# Patient Record
Sex: FEMALE
[De-identification: names, ages, dates, MRNs, and addresses within clinical notes are randomized per-mention and may not be internally consistent; named-entity substitution may affect disease eponyms.]

---

## 2024-08-15 ENCOUNTER — HOSPITAL ENCOUNTER (OUTPATIENT)
Facility: HOSPITAL | Age: 49
Setting detail: SPECIMEN
Discharge: HOME | End: 2024-08-18

## 2024-08-15 PROCEDURE — 83036 HEMOGLOBIN GLYCOSYLATED A1C: CPT

## 2024-08-15 PROCEDURE — 80053 COMPREHEN METABOLIC PANEL: CPT

## 2024-08-15 PROCEDURE — 80061 LIPID PANEL: CPT

## 2024-08-16 LAB
ALBUMIN SERPL-MCNC: 3.9 G/DL (ref 3.5–5)
ALBUMIN/GLOB SERPL: 1 (ref 1.1–2.2)
ALP SERPL-CCNC: 84 U/L (ref 45–117)
ALT SERPL-CCNC: 16 U/L (ref 12–78)
ANION GAP SERPL CALC-SCNC: 3 MMOL/L (ref 5–15)
AST SERPL-CCNC: 10 U/L (ref 15–37)
BILIRUB SERPL-MCNC: 0.2 MG/DL (ref 0.2–1)
BUN SERPL-MCNC: 13 MG/DL (ref 6–20)
BUN/CREAT SERPL: 21 (ref 12–20)
CALCIUM SERPL-MCNC: 9.8 MG/DL (ref 8.5–10.1)
CHLORIDE SERPL-SCNC: 110 MMOL/L (ref 97–108)
CHOLEST SERPL-MCNC: 193 MG/DL
CO2 SERPL-SCNC: 27 MMOL/L (ref 21–32)
CREAT SERPL-MCNC: 0.61 MG/DL (ref 0.55–1.02)
EST. AVERAGE GLUCOSE BLD GHB EST-MCNC: 114 MG/DL
GLOBULIN SER CALC-MCNC: 4 G/DL (ref 2–4)
GLUCOSE SERPL-MCNC: 97 MG/DL (ref 65–100)
HBA1C MFR BLD: 5.6 % (ref 4–5.6)
HDLC SERPL-MCNC: 46 MG/DL
HDLC SERPL: 4.2 (ref 0–5)
LDLC SERPL CALC-MCNC: 101.4 MG/DL (ref 0–100)
POTASSIUM SERPL-SCNC: 4.3 MMOL/L (ref 3.5–5.1)
PROT SERPL-MCNC: 7.9 G/DL (ref 6.4–8.2)
SODIUM SERPL-SCNC: 140 MMOL/L (ref 136–145)
TRIGL SERPL-MCNC: 228 MG/DL
VLDLC SERPL CALC-MCNC: 45.6 MG/DL

## 2024-08-20 ENCOUNTER — TELEPHONE (OUTPATIENT)
Age: 49
End: 2024-08-20

## 2024-08-20 NOTE — TELEPHONE ENCOUNTER
Called patient to remind of upcoming appointment with [facility] on 8/28/2024- no answer. Left message with appt. Details and [facility] hotline in case that she wants to cancel/reschedule.    Jose Garcia

## 2024-08-26 ENCOUNTER — TELEPHONE (OUTPATIENT)
Age: 49
End: 2024-08-26

## 2024-08-26 NOTE — TELEPHONE ENCOUNTER
Calling patient in re: location change for appointment on 8/28/2024 with [facility]. Pt did not answer. Left message for a call back. Violeta Meredith RN

## 2024-08-28 ENCOUNTER — HOSPITAL ENCOUNTER (OUTPATIENT)
Facility: HOSPITAL | Age: 49
Setting detail: SPECIMEN
Discharge: HOME | End: 2024-08-31

## 2024-08-28 ENCOUNTER — HOSPITAL ENCOUNTER (OUTPATIENT)
Facility: HOSPITAL | Age: 49
Discharge: HOME | End: 2024-08-31

## 2024-08-28 ENCOUNTER — OFFICE VISIT (OUTPATIENT)
Age: 49
End: 2024-08-28

## 2024-08-28 VITALS — WEIGHT: 153 LBS | HEIGHT: 61 IN | BODY MASS INDEX: 28.89 KG/M2

## 2024-08-28 DIAGNOSIS — Z01.419 ENCOUNTER FOR WELL WOMAN EXAM: Primary | ICD-10-CM

## 2024-08-28 DIAGNOSIS — Z12.31 VISIT FOR SCREENING MAMMOGRAM: ICD-10-CM

## 2024-08-28 PROCEDURE — 82728 ASSAY OF FERRITIN: CPT

## 2024-08-28 PROCEDURE — 85025 COMPLETE CBC W/AUTO DIFF WBC: CPT

## 2024-08-28 PROCEDURE — 77063 BREAST TOMOSYNTHESIS BI: CPT

## 2024-08-28 RX ORDER — IBUPROFEN 200 MG
200 TABLET ORAL EVERY 6 HOURS PRN
COMMUNITY

## 2024-08-28 NOTE — PROGRESS NOTES
Assessment/Plan:    Charito was seen today for well woman visit.    Diagnoses and all orders for this visit:    Encounter for well woman exam        No follow-up provider specified.    Zuleyma Leonardo PA-C  Charito Luciano Singh expressed understanding of this plan. An AVS was printed and given to the patient.      ----------------------------------------------------------------------    Chief Complaint   Patient presents with    Well Woman Visit       History of Present Illness:  [facility] annual well woman visit, this is her first visit with us. [place] g[place] care [place]  She had her last pap 2 years ago and is scheduled with [facility] for a pap appt next month  Last period 9 months ago. Having \"heat\" around my neck, + vaginal dryness  Discussed general recs for menopausal sxs and good health after menopause     No breast concerns or complaints  No risk for DV     No past medical history on file.    Current Outpatient Medications   Medication Sig Dispense Refill    ibuprofen (ADVIL;MOTRIN) 200 MG tablet Take 1 tablet by mouth every 6 hours as needed for Pain       No current facility-administered medications for this visit.       No Known Allergies    Social History     Tobacco Use    Smoking status: Never    Smokeless tobacco: Never       No family history on file.    Physical Exam:     LMP 2024 (Approximate)     A&Ox3  WDWN NAD  Respirations normal and non labored  Breast exam- susan neg for mass, tenderness, skin color changes, dimpling or retractions

## 2024-08-28 NOTE — PROGRESS NOTES
EV[facility]OM[facility] LIFE HISTORY QUESTIONNAIRE       No Yes Comments   Has a doctor ever seen or felt anything wrong with your breast? [x]                                  []                                     Have you ever had a breast biopsy? []                                  [x]                                  Pt marked yes on questionnaire- 2 years ago on right breast. Unknown result        When and where was last mammogram performed?  2 years ago- [place]    Have you ever been told that there was a problem on your mammogram?   No Yes Comments   [x]                                  []                                       Do you have breast implants?   No Yes Comments   [x]                                  []                                       When was your last Pap test performed? 2 years ago in [place] but would like a pap smear today     Have you ever had an abnormal Pap test?   No Yes Comments   [x]                                  []                                       Have you had a hysterectomy?   No Yes Comments (why)   [x]                                  []                                       Have you been through menopause?   No Yes Date of LMP   [x]                                  []                                  9 months ago 1/1/2024     Did your mother take ALEXANDR?  No Yes Unknown   []                                  []                                  x     Do you have a history of HIV exposure?  No Yes    [x]                                  []                                       Have you ever been diagnosed with any type of Cancer   No Yes Comments (type,when,where,type of treatment   [x]                                  []                                          Has a family member been diagnosed with breast or ovarian cancer?   No Yes Comments (which family members, and type   [x]                                  []                                       Are you taking hormone  replacement therapy (HRT)     No Yes Comments   [x]                                  []                                       How many times have you been pregnant?   3        Number of live births ? 3    Are you experiencing any of the following?   No Yes Comments   Nipple Discharge [x]                                  []                                     Breast Lump/Masses [x]                                  []                                     Breast Skin Changes [x]                                  []                                          No Yes Comments   Vaginal Discharge [x]                                  []                                     Abnormal/unusual vaginal bleeding [x]                                  []                                         Are you experiencing any other health problems?  None reported      Age at first period? 15  Age at first birth? 18    Ht-- 5'1    Wt-- 153 lbs

## 2024-08-29 LAB
BASOPHILS # BLD: 0.1 K/UL (ref 0–0.1)
BASOPHILS NFR BLD: 1 % (ref 0–1)
DIFFERENTIAL METHOD BLD: ABNORMAL
EOSINOPHIL # BLD: 0.2 K/UL (ref 0–0.4)
EOSINOPHIL NFR BLD: 2 % (ref 0–7)
ERYTHROCYTE [DISTWIDTH] IN BLOOD BY AUTOMATED COUNT: 20.9 % (ref 11.5–14.5)
FERRITIN SERPL-MCNC: 4 NG/ML (ref 8–252)
HCT VFR BLD AUTO: 30.9 % (ref 35–47)
HGB BLD-MCNC: 8.5 G/DL (ref 11.5–16)
IMM GRANULOCYTES # BLD AUTO: 0 K/UL (ref 0–0.04)
IMM GRANULOCYTES NFR BLD AUTO: 0 % (ref 0–0.5)
LYMPHOCYTES # BLD: 2.5 K/UL (ref 0.8–3.5)
LYMPHOCYTES NFR BLD: 26 % (ref 12–49)
MCH RBC QN AUTO: 18.4 PG (ref 26–34)
MCHC RBC AUTO-ENTMCNC: 27.5 G/DL (ref 30–36.5)
MCV RBC AUTO: 66.7 FL (ref 80–99)
MONOCYTES # BLD: 0.9 K/UL (ref 0–1)
MONOCYTES NFR BLD: 9 % (ref 5–13)
NEUTS SEG # BLD: 6.1 K/UL (ref 1.8–8)
NEUTS SEG NFR BLD: 62 % (ref 32–75)
NRBC # BLD: 0 K/UL (ref 0–0.01)
NRBC BLD-RTO: 0 PER 100 WBC
PLATELET # BLD AUTO: 444 K/UL (ref 150–400)
RBC # BLD AUTO: 4.63 M/UL (ref 3.8–5.2)
RBC MORPH BLD: ABNORMAL
WBC # BLD AUTO: 9.8 K/UL (ref 3.6–11)

## 2024-09-18 ENCOUNTER — HOSPITAL ENCOUNTER (INPATIENT)
Facility: HOSPITAL | Age: 49
LOS: 13 days | Discharge: HOME | DRG: 330 | End: 2024-10-01
Attending: EMERGENCY MEDICINE | Admitting: INTERNAL MEDICINE

## 2024-09-18 ENCOUNTER — APPOINTMENT (OUTPATIENT)
Facility: HOSPITAL | Age: 49
DRG: 330 | End: 2024-09-18

## 2024-09-18 DIAGNOSIS — K62.5 RECTAL BLEEDING: ICD-10-CM

## 2024-09-18 DIAGNOSIS — K63.89 COLONIC MASS: Primary | ICD-10-CM

## 2024-09-18 DIAGNOSIS — C20 RECTAL CANCER (HCC): ICD-10-CM

## 2024-09-18 PROBLEM — D62 ACUTE BLOOD LOSS ANEMIA: Status: ACTIVE | Noted: 2024-09-18

## 2024-09-18 PROBLEM — R42 DIZZINESS: Status: ACTIVE | Noted: 2024-09-18

## 2024-09-18 LAB
ALBUMIN SERPL-MCNC: 3.7 G/DL (ref 3.5–5)
ALBUMIN/GLOB SERPL: 0.9 (ref 1.1–2.2)
ALP SERPL-CCNC: 79 U/L (ref 45–117)
ALT SERPL-CCNC: 18 U/L (ref 12–78)
ANION GAP SERPL CALC-SCNC: 4 MMOL/L (ref 2–12)
AST SERPL-CCNC: 12 U/L (ref 15–37)
BASOPHILS # BLD: 0.1 K/UL (ref 0–0.1)
BASOPHILS NFR BLD: 1 % (ref 0–1)
BILIRUB SERPL-MCNC: 0.3 MG/DL (ref 0.2–1)
BUN SERPL-MCNC: 10 MG/DL (ref 6–20)
BUN/CREAT SERPL: 14 (ref 12–20)
CALCIUM SERPL-MCNC: 9.1 MG/DL (ref 8.5–10.1)
CHLORIDE SERPL-SCNC: 109 MMOL/L (ref 97–108)
CO2 SERPL-SCNC: 26 MMOL/L (ref 21–32)
CREAT SERPL-MCNC: 0.69 MG/DL (ref 0.55–1.02)
DIFFERENTIAL METHOD BLD: ABNORMAL
EOSINOPHIL # BLD: 0.4 K/UL (ref 0–0.4)
EOSINOPHIL NFR BLD: 3 % (ref 0–7)
ERYTHROCYTE [DISTWIDTH] IN BLOOD BY AUTOMATED COUNT: 19.9 % (ref 11.5–14.5)
FERRITIN SERPL-MCNC: 3 NG/ML (ref 26–388)
FOLATE SERPL-MCNC: 16.1 NG/ML (ref 5–21)
GLOBULIN SER CALC-MCNC: 4.1 G/DL (ref 2–4)
GLUCOSE SERPL-MCNC: 99 MG/DL (ref 65–100)
HCT VFR BLD AUTO: 26.6 % (ref 35–47)
HCT VFR BLD AUTO: 29.8 % (ref 35–47)
HCT VFR BLD AUTO: 30.6 % (ref 35–47)
HCT VFR BLD AUTO: 33.8 % (ref 35–47)
HEMOCCULT STL QL: POSITIVE
HGB BLD-MCNC: 10.1 G/DL (ref 11.5–16)
HGB BLD-MCNC: 7.6 G/DL (ref 11.5–16)
HGB BLD-MCNC: 8.9 G/DL (ref 11.5–16)
HGB BLD-MCNC: 9.1 G/DL (ref 11.5–16)
HISTORY CHECK: NORMAL
IMM GRANULOCYTES # BLD AUTO: 0.1 K/UL (ref 0–0.04)
IMM GRANULOCYTES NFR BLD AUTO: 1 % (ref 0–0.5)
IRON SATN MFR SERPL: 3 % (ref 20–50)
IRON SERPL-MCNC: 16 UG/DL (ref 35–150)
LIPASE SERPL-CCNC: 32 U/L (ref 13–75)
LYMPHOCYTES # BLD: 3.8 K/UL (ref 0.8–3.5)
LYMPHOCYTES NFR BLD: 32 % (ref 12–49)
MAGNESIUM SERPL-MCNC: 2.3 MG/DL (ref 1.6–2.4)
MCH RBC QN AUTO: 18.3 PG (ref 26–34)
MCHC RBC AUTO-ENTMCNC: 28.6 G/DL (ref 30–36.5)
MCV RBC AUTO: 63.9 FL (ref 80–99)
MONOCYTES # BLD: 1.2 K/UL (ref 0–1)
MONOCYTES NFR BLD: 10 % (ref 5–13)
NEUTS SEG # BLD: 6.2 K/UL (ref 1.8–8)
NEUTS SEG NFR BLD: 53 % (ref 32–75)
NRBC # BLD: 0 K/UL (ref 0–0.01)
NRBC BLD-RTO: 0 PER 100 WBC
PLATELET # BLD AUTO: 438 K/UL (ref 150–400)
POTASSIUM SERPL-SCNC: 3.7 MMOL/L (ref 3.5–5.1)
PROT SERPL-MCNC: 7.8 G/DL (ref 6.4–8.2)
RBC # BLD AUTO: 4.16 M/UL (ref 3.8–5.2)
RBC MORPH BLD: ABNORMAL
SODIUM SERPL-SCNC: 139 MMOL/L (ref 136–145)
TIBC SERPL-MCNC: 499 UG/DL (ref 250–450)
VIT B12 SERPL-MCNC: 238 PG/ML (ref 193–986)
WBC # BLD AUTO: 11.8 K/UL (ref 3.6–11)

## 2024-09-18 PROCEDURE — 82746 ASSAY OF FOLIC ACID SERUM: CPT

## 2024-09-18 PROCEDURE — 6360000004 HC RX CONTRAST MEDICATION: Performed by: INTERNAL MEDICINE

## 2024-09-18 PROCEDURE — 71260 CT THORAX DX C+: CPT

## 2024-09-18 PROCEDURE — 83550 IRON BINDING TEST: CPT

## 2024-09-18 PROCEDURE — 36430 TRANSFUSION BLD/BLD COMPNT: CPT

## 2024-09-18 PROCEDURE — 85018 HEMOGLOBIN: CPT

## 2024-09-18 PROCEDURE — 80053 COMPREHEN METABOLIC PANEL: CPT

## 2024-09-18 PROCEDURE — 6360000004 HC RX CONTRAST MEDICATION: Performed by: RADIOLOGY

## 2024-09-18 PROCEDURE — 86923 COMPATIBILITY TEST ELECTRIC: CPT

## 2024-09-18 PROCEDURE — 82728 ASSAY OF FERRITIN: CPT

## 2024-09-18 PROCEDURE — 99254 IP/OBS CNSLTJ NEW/EST MOD 60: CPT | Performed by: INTERNAL MEDICINE

## 2024-09-18 PROCEDURE — 99285 EMERGENCY DEPT VISIT HI MDM: CPT

## 2024-09-18 PROCEDURE — 85014 HEMATOCRIT: CPT

## 2024-09-18 PROCEDURE — 86901 BLOOD TYPING SEROLOGIC RH(D): CPT

## 2024-09-18 PROCEDURE — 85025 COMPLETE CBC W/AUTO DIFF WBC: CPT

## 2024-09-18 PROCEDURE — 6370000000 HC RX 637 (ALT 250 FOR IP): Performed by: INTERNAL MEDICINE

## 2024-09-18 PROCEDURE — 6360000002 HC RX W HCPCS: Performed by: INTERNAL MEDICINE

## 2024-09-18 PROCEDURE — 36415 COLL VENOUS BLD VENIPUNCTURE: CPT

## 2024-09-18 PROCEDURE — 94761 N-INVAS EAR/PLS OXIMETRY MLT: CPT

## 2024-09-18 PROCEDURE — 2580000003 HC RX 258: Performed by: INTERNAL MEDICINE

## 2024-09-18 PROCEDURE — 86850 RBC ANTIBODY SCREEN: CPT

## 2024-09-18 PROCEDURE — 83540 ASSAY OF IRON: CPT

## 2024-09-18 PROCEDURE — 83735 ASSAY OF MAGNESIUM: CPT

## 2024-09-18 PROCEDURE — 82272 OCCULT BLD FECES 1-3 TESTS: CPT

## 2024-09-18 PROCEDURE — 83690 ASSAY OF LIPASE: CPT

## 2024-09-18 PROCEDURE — 74178 CT ABD&PLV WO CNTR FLWD CNTR: CPT

## 2024-09-18 PROCEDURE — 86900 BLOOD TYPING SEROLOGIC ABO: CPT

## 2024-09-18 PROCEDURE — 30233N1 TRANSFUSION OF NONAUTOLOGOUS RED BLOOD CELLS INTO PERIPHERAL VEIN, PERCUTANEOUS APPROACH: ICD-10-PCS | Performed by: COLON & RECTAL SURGERY

## 2024-09-18 PROCEDURE — 1100000000 HC RM PRIVATE

## 2024-09-18 PROCEDURE — 82607 VITAMIN B-12: CPT

## 2024-09-18 PROCEDURE — 6360000002 HC RX W HCPCS: Performed by: NURSE PRACTITIONER

## 2024-09-18 PROCEDURE — P9016 RBC LEUKOCYTES REDUCED: HCPCS

## 2024-09-18 RX ORDER — POLYETHYLENE GLYCOL 3350 17 G/17G
17 POWDER, FOR SOLUTION ORAL DAILY PRN
Status: DISCONTINUED | OUTPATIENT
Start: 2024-09-18 | End: 2024-09-25

## 2024-09-18 RX ORDER — ACETAMINOPHEN 650 MG/1
650 SUPPOSITORY RECTAL EVERY 6 HOURS PRN
Status: DISCONTINUED | OUTPATIENT
Start: 2024-09-18 | End: 2024-09-27

## 2024-09-18 RX ORDER — MAGNESIUM SULFATE IN WATER 40 MG/ML
2000 INJECTION, SOLUTION INTRAVENOUS PRN
Status: DISCONTINUED | OUTPATIENT
Start: 2024-09-18 | End: 2024-10-01 | Stop reason: HOSPADM

## 2024-09-18 RX ORDER — IOPAMIDOL 755 MG/ML
100 INJECTION, SOLUTION INTRAVASCULAR
Status: COMPLETED | OUTPATIENT
Start: 2024-09-18 | End: 2024-09-18

## 2024-09-18 RX ORDER — PANTOPRAZOLE SODIUM 40 MG/1
40 TABLET, DELAYED RELEASE ORAL
Status: DISCONTINUED | OUTPATIENT
Start: 2024-09-18 | End: 2024-09-24

## 2024-09-18 RX ORDER — ACETAMINOPHEN 325 MG/1
650 TABLET ORAL EVERY 6 HOURS PRN
Status: DISCONTINUED | OUTPATIENT
Start: 2024-09-18 | End: 2024-09-27

## 2024-09-18 RX ORDER — SODIUM CHLORIDE 9 MG/ML
INJECTION, SOLUTION INTRAVENOUS PRN
Status: DISCONTINUED | OUTPATIENT
Start: 2024-09-18 | End: 2024-10-01 | Stop reason: HOSPADM

## 2024-09-18 RX ORDER — SODIUM CHLORIDE 0.9 % (FLUSH) 0.9 %
5-40 SYRINGE (ML) INJECTION PRN
Status: DISCONTINUED | OUTPATIENT
Start: 2024-09-18 | End: 2024-10-01 | Stop reason: HOSPADM

## 2024-09-18 RX ORDER — ONDANSETRON 4 MG/1
4 TABLET, ORALLY DISINTEGRATING ORAL EVERY 8 HOURS PRN
Status: DISCONTINUED | OUTPATIENT
Start: 2024-09-18 | End: 2024-09-26

## 2024-09-18 RX ORDER — ONDANSETRON 2 MG/ML
4 INJECTION INTRAMUSCULAR; INTRAVENOUS 2 TIMES DAILY
Status: DISPENSED | OUTPATIENT
Start: 2024-09-18 | End: 2024-09-20

## 2024-09-18 RX ORDER — ONDANSETRON 2 MG/ML
4 INJECTION INTRAMUSCULAR; INTRAVENOUS EVERY 6 HOURS PRN
Status: DISCONTINUED | OUTPATIENT
Start: 2024-09-18 | End: 2024-09-26

## 2024-09-18 RX ORDER — SODIUM CHLORIDE 0.9 % (FLUSH) 0.9 %
5-40 SYRINGE (ML) INJECTION EVERY 12 HOURS SCHEDULED
Status: DISCONTINUED | OUTPATIENT
Start: 2024-09-18 | End: 2024-10-01 | Stop reason: HOSPADM

## 2024-09-18 RX ADMIN — IOPAMIDOL 80 ML: 755 INJECTION, SOLUTION INTRAVENOUS at 19:10

## 2024-09-18 RX ADMIN — IRON SUCROSE 200 MG: 20 INJECTION, SOLUTION INTRAVENOUS at 17:25

## 2024-09-18 RX ADMIN — IOPAMIDOL 100 ML: 755 INJECTION, SOLUTION INTRAVENOUS at 01:26

## 2024-09-18 RX ADMIN — PANTOPRAZOLE SODIUM 40 MG: 40 TABLET, DELAYED RELEASE ORAL at 05:33

## 2024-09-18 RX ADMIN — SODIUM CHLORIDE, PRESERVATIVE FREE 10 ML: 5 INJECTION INTRAVENOUS at 20:28

## 2024-09-18 RX ADMIN — PANTOPRAZOLE SODIUM 40 MG: 40 TABLET, DELAYED RELEASE ORAL at 17:25

## 2024-09-18 RX ADMIN — ONDANSETRON 4 MG: 2 INJECTION INTRAMUSCULAR; INTRAVENOUS at 09:53

## 2024-09-18 RX ADMIN — SODIUM CHLORIDE, PRESERVATIVE FREE 10 ML: 5 INJECTION INTRAVENOUS at 09:53

## 2024-09-18 ASSESSMENT — PAIN DESCRIPTION - LOCATION: LOCATION: ABDOMEN

## 2024-09-18 ASSESSMENT — PAIN DESCRIPTION - ORIENTATION: ORIENTATION: LOWER

## 2024-09-18 ASSESSMENT — PAIN - FUNCTIONAL ASSESSMENT: PAIN_FUNCTIONAL_ASSESSMENT: 0-10

## 2024-09-18 ASSESSMENT — LIFESTYLE VARIABLES: HOW OFTEN DO YOU HAVE A DRINK CONTAINING ALCOHOL: NEVER

## 2024-09-18 ASSESSMENT — PAIN SCALES - GENERAL: PAINLEVEL_OUTOF10: 2

## 2024-09-18 NOTE — CONSENT
Informed Consent for Blood Component Transfusion Note    I have discussed with the patient the rationale for blood component transfusion; its benefits in treating or preventing fatigue, organ damage, or death; and its risk which includes mild transfusion reactions, rare risk of blood borne infection, or more serious but rare reactions. I have discussed the alternatives to transfusion, including the risk and consequences of not receiving transfusion. The patient had an opportunity to ask questions and had agreed to proceed with transfusion of blood components as needed.    Electronically signed by Sergey Mayo MD on 9/18/24 at 3:33 AM EDT

## 2024-09-18 NOTE — ED PROVIDER NOTES
EMERGENCY DEPARTMENT PHYSICIAN NOTE     Patient: Charito Singh     Time of Service: 9/18/2024 12:19 AM     Chief complaint:   Chief Complaint   Patient presents with    Rectal Bleeding        HISTORY:  Patient is a 49 y.o. female who presents to the emergency department with complaints of rectal bleeding.       No past medical history on file.     Past Surgical History:   Procedure Laterality Date    BREAST BIOPSY Right     10 years ago        No family history on file.     Social History     Socioeconomic History    Marital status:    Tobacco Use    Smoking status: Never    Smokeless tobacco: Never        Current Medications: Reviewed in chart.    Allergies: No Known Allergies       REVIEW OF SYSTEMS: See HPI for pertinent positives and negatives.      PHYSICAL EXAM:  /66   Pulse 89   Temp 98.4 °F (36.9 °C) (Oral)   Resp 16   Ht 1.626 m (5' 4\")   Wt 69 kg (152 lb 1.9 oz)   LMP 01/01/2024 (Approximate)   SpO2 100%   BMI 26.11 kg/m²    Physical Exam  Vitals and nursing note reviewed. Exam conducted with a chaperone present.   Constitutional:       General: She is not in acute distress.     Appearance: Normal appearance. She is normal weight. She is not toxic-appearing.   HENT:      Head: Normocephalic and atraumatic.      Nose: Nose normal.      Mouth/Throat:      Mouth: Mucous membranes are moist.      Pharynx: Oropharynx is clear.   Eyes:      Extraocular Movements: Extraocular movements intact.      Conjunctiva/sclera: Conjunctivae normal.      Pupils: Pupils are equal, round, and reactive to light.   Cardiovascular:      Rate and Rhythm: Normal rate and regular rhythm.      Pulses: Normal pulses.      Heart sounds: Normal heart sounds.   Pulmonary:      Effort: Pulmonary effort is normal.      Breath sounds: Normal breath sounds. No wheezing.   Abdominal:      General: Abdomen is flat. Bowel sounds are normal.      Palpations: Abdomen is soft.      Tenderness: There is

## 2024-09-18 NOTE — CARE COORDINATION
9/18/2024  4:20 PM     09/18/24 8056   Service Assessment   Patient Orientation Alert and Oriented   Cognition Alert   History Provided By Patient   Primary Caregiver Self   Support Systems Friends/Neighbors   Patient's Healthcare Decision Maker is: Legal Next of Kin   PCP Verified by CM No  (pt has no PCP)   Prior Functional Level Independent in ADLs/IADLs   Current Functional Level Independent in ADLs/IADLs   Can patient return to prior living arrangement Yes   Ability to make needs known: Good   Family able to assist with home care needs: Yes   Financial Resources None   Community Resources None   Social/Functional History   Lives With Friend(s)   Type of Home Trailer   Home Equipment None   ADL Assistance Independent   Ambulation Assistance Independent   Transfer Assistance Independent   Active  No   Patient's  Info Friend or public transportation   Mode of Transportation Car;Bus   Discharge Planning   Type of Residence House   Living Arrangements Friends   Current Services Prior To Admission None   Potential Assistance Needed Other (Comment)  (Medications)   Potential Assistance Purchasing Medications Yes   Type of Home Care Services None   Patient expects to be discharged to: Trailer/mobile home   History of falls? 0   Services At/After Discharge   Mode of Transport at Discharge Other (see comment)  (Friend or Lyft/Uber)   Confirm Follow Up Transport Self     Pt is a 48 yo Ethiopian speaking female, emergently admitted 9/18/24 for colonic mass, rectal bleeding, GI is following, plan for colonoscopy 9/20, Oncology following.  CM met w/ pt for DC planning, using [email or web address]  Harsh # [id number]   CM introduced self, role of CM.  Charted demographics verified, pt lives w/ friend Jojo Sexton in a trailer home  At baseline pt reports to be ambulatory, independent  w/ ADLs/iADLs, pt uses no DME  Pt has seen MD at [facility] 8/2024 for colon   Pt is not employed and has not applied for Medicaid in

## 2024-09-18 NOTE — CONSENT
Informed Consent for Blood Component Transfusion Note    I have discussed with the patient the rationale for blood component transfusion; its benefits in treating or preventing fatigue, organ damage, or death; and its risk which includes mild transfusion reactions, rare risk of blood borne infection, or more serious but rare reactions. I have discussed the alternatives to transfusion, including the risk and consequences of not receiving transfusion. The patient had an opportunity to ask questions and had agreed to proceed with transfusion of blood components.    Electronically signed by Isaiah Dao DO on 9/18/24 at 2:51 AM EDT

## 2024-09-18 NOTE — ED NOTES
TRANSFER - OUT REPORT:    Verbal report given on Charito Singh  being transferred to [facility] for routine progression of patient care       Report consisted of patient's Situation, Background, Assessment and   Recommendations(SBAR).     Information from the following report(s) Nurse Handoff Report, ED SBAR, MAR, Recent Results, Quality Measures, and Neuro Assessment was reviewed with the receiving nurse.    [place] Fall Assessment:    Presents to emergency department  because of falls (Syncope, seizure, or loss of consciousness): No  Age > 70: No  Altered Mental Status, Intoxication with alcohol or substance confusion (Disorientation, impaired judgment, poor safety awaremess, or inability to follow instructions): No  Impaired Mobility: Ambulates or transfers with assistive devices or assistance; Unable to ambulate or transer.: No  Nursing Judgement: No          Lines:   Peripheral IV 09/18/24 Left Antecubital (Active)        Opportunity for questions and clarification was provided.      Patient transported with:  Registered Nurse

## 2024-09-18 NOTE — ED TRIAGE NOTES
Patient arrives via POV with complaints of rectal bleeding.  Has had bloody bowel movements since last Saturday.  Clots appear to be in the stool.  Has abdominal pain, has N/V.  Not on blood thinners.  Denies abdominal surgeries.  Says she has been \"constipated for a year\".    Became dizzy this morning while using bathroom.

## 2024-09-19 LAB
ABO + RH BLD: NORMAL
ALBUMIN SERPL-MCNC: 3.5 G/DL (ref 3.5–5)
ALBUMIN/GLOB SERPL: 0.9 (ref 1.1–2.2)
ALP SERPL-CCNC: 70 U/L (ref 45–117)
ALT SERPL-CCNC: 15 U/L (ref 12–78)
ANION GAP SERPL CALC-SCNC: 7 MMOL/L (ref 2–12)
AST SERPL-CCNC: 12 U/L (ref 15–37)
BASOPHILS # BLD: 0.1 K/UL (ref 0–0.1)
BASOPHILS NFR BLD: 1 % (ref 0–1)
BILIRUB SERPL-MCNC: 0.6 MG/DL (ref 0.2–1)
BLD PROD TYP BPU: NORMAL
BLOOD BANK BLOOD PRODUCT EXPIRATION DATE: NORMAL
BLOOD BANK DISPENSE STATUS: NORMAL
BLOOD BANK ISBT PRODUCT BLOOD TYPE: 5100
BLOOD BANK PRODUCT CODE: NORMAL
BLOOD BANK UNIT TYPE AND RH: NORMAL
BLOOD GROUP ANTIBODIES SERPL: NORMAL
BPU ID: NORMAL
BUN SERPL-MCNC: 7 MG/DL (ref 6–20)
BUN/CREAT SERPL: 12 (ref 12–20)
CALCIUM SERPL-MCNC: 9.2 MG/DL (ref 8.5–10.1)
CEA SERPL-MCNC: 1.2 NG/ML
CHLORIDE SERPL-SCNC: 107 MMOL/L (ref 97–108)
CO2 SERPL-SCNC: 25 MMOL/L (ref 21–32)
CREAT SERPL-MCNC: 0.59 MG/DL (ref 0.55–1.02)
CROSSMATCH RESULT: NORMAL
DIFFERENTIAL METHOD BLD: ABNORMAL
EOSINOPHIL # BLD: 0.3 K/UL (ref 0–0.4)
EOSINOPHIL NFR BLD: 4 % (ref 0–7)
ERYTHROCYTE [DISTWIDTH] IN BLOOD BY AUTOMATED COUNT: 22.9 % (ref 11.5–14.5)
GLOBULIN SER CALC-MCNC: 3.9 G/DL (ref 2–4)
GLUCOSE SERPL-MCNC: 100 MG/DL (ref 65–100)
HCT VFR BLD AUTO: 29.9 % (ref 35–47)
HCT VFR BLD AUTO: 30.7 % (ref 35–47)
HGB BLD-MCNC: 9 G/DL (ref 11.5–16)
HGB BLD-MCNC: 9.1 G/DL (ref 11.5–16)
IMM GRANULOCYTES # BLD AUTO: 0.1 K/UL (ref 0–0.04)
IMM GRANULOCYTES NFR BLD AUTO: 1 % (ref 0–0.5)
LYMPHOCYTES # BLD: 1.1 K/UL (ref 0.8–3.5)
LYMPHOCYTES NFR BLD: 14 % (ref 12–49)
MCH RBC QN AUTO: 20.1 PG (ref 26–34)
MCHC RBC AUTO-ENTMCNC: 30.1 G/DL (ref 30–36.5)
MCV RBC AUTO: 66.9 FL (ref 80–99)
MONOCYTES # BLD: 0.7 K/UL (ref 0–1)
MONOCYTES NFR BLD: 8 % (ref 5–13)
NEUTS SEG # BLD: 5.9 K/UL (ref 1.8–8)
NEUTS SEG NFR BLD: 72 % (ref 32–75)
NRBC # BLD: 0 K/UL (ref 0–0.01)
NRBC BLD-RTO: 0 PER 100 WBC
PLATELET # BLD AUTO: 379 K/UL (ref 150–400)
PMV BLD AUTO: 10.6 FL (ref 8.9–12.9)
POTASSIUM SERPL-SCNC: 3.7 MMOL/L (ref 3.5–5.1)
PROT SERPL-MCNC: 7.4 G/DL (ref 6.4–8.2)
RBC # BLD AUTO: 4.47 M/UL (ref 3.8–5.2)
RBC MORPH BLD: ABNORMAL
SODIUM SERPL-SCNC: 139 MMOL/L (ref 136–145)
SPECIMEN EXP DATE BLD: NORMAL
UNIT DIVISION: 0
UNIT ISSUE DATE/TIME: NORMAL
WBC # BLD AUTO: 8.2 K/UL (ref 3.6–11)

## 2024-09-19 PROCEDURE — 6370000000 HC RX 637 (ALT 250 FOR IP): Performed by: INTERNAL MEDICINE

## 2024-09-19 PROCEDURE — 93005 ELECTROCARDIOGRAM TRACING: CPT | Performed by: INTERNAL MEDICINE

## 2024-09-19 PROCEDURE — 2580000003 HC RX 258

## 2024-09-19 PROCEDURE — 2580000003 HC RX 258: Performed by: INTERNAL MEDICINE

## 2024-09-19 PROCEDURE — 94761 N-INVAS EAR/PLS OXIMETRY MLT: CPT

## 2024-09-19 PROCEDURE — 6370000000 HC RX 637 (ALT 250 FOR IP)

## 2024-09-19 PROCEDURE — 36415 COLL VENOUS BLD VENIPUNCTURE: CPT

## 2024-09-19 PROCEDURE — 1100000000 HC RM PRIVATE

## 2024-09-19 PROCEDURE — 80053 COMPREHEN METABOLIC PANEL: CPT

## 2024-09-19 PROCEDURE — 6360000002 HC RX W HCPCS: Performed by: INTERNAL MEDICINE

## 2024-09-19 PROCEDURE — 82378 CARCINOEMBRYONIC ANTIGEN: CPT

## 2024-09-19 PROCEDURE — 85025 COMPLETE CBC W/AUTO DIFF WBC: CPT

## 2024-09-19 PROCEDURE — 6360000002 HC RX W HCPCS: Performed by: NURSE PRACTITIONER

## 2024-09-19 PROCEDURE — 85018 HEMOGLOBIN: CPT

## 2024-09-19 PROCEDURE — 85014 HEMATOCRIT: CPT

## 2024-09-19 RX ORDER — SODIUM CHLORIDE 9 MG/ML
INJECTION, SOLUTION INTRAVENOUS CONTINUOUS
Status: DISCONTINUED | OUTPATIENT
Start: 2024-09-19 | End: 2024-09-22

## 2024-09-19 RX ORDER — 0.9 % SODIUM CHLORIDE 0.9 %
500 INTRAVENOUS SOLUTION INTRAVENOUS ONCE
Status: COMPLETED | OUTPATIENT
Start: 2024-09-19 | End: 2024-09-19

## 2024-09-19 RX ORDER — HYDROMORPHONE HYDROCHLORIDE 1 MG/ML
1 INJECTION, SOLUTION INTRAMUSCULAR; INTRAVENOUS; SUBCUTANEOUS EVERY 4 HOURS PRN
Status: COMPLETED | OUTPATIENT
Start: 2024-09-19 | End: 2024-09-20

## 2024-09-19 RX ORDER — MORPHINE SULFATE 2 MG/ML
2 INJECTION, SOLUTION INTRAMUSCULAR; INTRAVENOUS ONCE
Status: COMPLETED | OUTPATIENT
Start: 2024-09-19 | End: 2024-09-19

## 2024-09-19 RX ADMIN — SODIUM CHLORIDE: 9 INJECTION, SOLUTION INTRAVENOUS at 16:53

## 2024-09-19 RX ADMIN — POLYETHYLENE GLYCOL 3350, SODIUM SULFATE ANHYDROUS, SODIUM BICARBONATE, SODIUM CHLORIDE, POTASSIUM CHLORIDE 2000 ML: 236; 22.74; 6.74; 5.86; 2.97 POWDER, FOR SOLUTION ORAL at 10:58

## 2024-09-19 RX ADMIN — MORPHINE SULFATE 2 MG: 2 INJECTION, SOLUTION INTRAMUSCULAR; INTRAVENOUS at 17:04

## 2024-09-19 RX ADMIN — HYDROMORPHONE HYDROCHLORIDE 1 MG: 1 INJECTION, SOLUTION INTRAMUSCULAR; INTRAVENOUS; SUBCUTANEOUS at 18:09

## 2024-09-19 RX ADMIN — SODIUM CHLORIDE 500 ML: 9 INJECTION, SOLUTION INTRAVENOUS at 13:56

## 2024-09-19 RX ADMIN — HYDROMORPHONE HYDROCHLORIDE 1 MG: 1 INJECTION, SOLUTION INTRAMUSCULAR; INTRAVENOUS; SUBCUTANEOUS at 22:23

## 2024-09-19 RX ADMIN — IRON SUCROSE 200 MG: 20 INJECTION, SOLUTION INTRAVENOUS at 17:45

## 2024-09-19 RX ADMIN — SODIUM CHLORIDE, PRESERVATIVE FREE 10 ML: 5 INJECTION INTRAVENOUS at 20:50

## 2024-09-19 RX ADMIN — PANTOPRAZOLE SODIUM 40 MG: 40 TABLET, DELAYED RELEASE ORAL at 17:04

## 2024-09-19 RX ADMIN — SODIUM PHOSPHATE, DIBASIC AND SODIUM PHOSPHATE, MONOBASIC 1 ENEMA: 7; 19 ENEMA RECTAL at 20:49

## 2024-09-19 RX ADMIN — ONDANSETRON 4 MG: 2 INJECTION INTRAMUSCULAR; INTRAVENOUS at 20:49

## 2024-09-19 RX ADMIN — SODIUM CHLORIDE: 9 INJECTION, SOLUTION INTRAVENOUS at 22:18

## 2024-09-19 RX ADMIN — PANTOPRAZOLE SODIUM 40 MG: 40 TABLET, DELAYED RELEASE ORAL at 06:23

## 2024-09-19 RX ADMIN — SODIUM CHLORIDE, PRESERVATIVE FREE 10 ML: 5 INJECTION INTRAVENOUS at 09:40

## 2024-09-19 RX ADMIN — ONDANSETRON 4 MG: 2 INJECTION INTRAMUSCULAR; INTRAVENOUS at 10:58

## 2024-09-19 ASSESSMENT — PAIN DESCRIPTION - LOCATION
LOCATION: ABDOMEN
LOCATION: ABDOMEN

## 2024-09-19 ASSESSMENT — PAIN DESCRIPTION - ORIENTATION: ORIENTATION: LOWER

## 2024-09-19 ASSESSMENT — PAIN SCALES - GENERAL
PAINLEVEL_OUTOF10: 7
PAINLEVEL_OUTOF10: 2
PAINLEVEL_OUTOF10: 8
PAINLEVEL_OUTOF10: 7
PAINLEVEL_OUTOF10: 6
PAINLEVEL_OUTOF10: 8

## 2024-09-19 ASSESSMENT — PAIN DESCRIPTION - DESCRIPTORS
DESCRIPTORS: ACHING
DESCRIPTORS: ACHING;PRESSURE

## 2024-09-19 NOTE — CARE COORDINATION
9/19/2024  9:20 AM  Care Management Progress Note    Reason for Admission:   Rectal bleeding [K62.5]  Colonic mass [K63.89]  Procedure(s) (LRB):  COLONOSCOPY DIAGNOSTIC (N/A)       Patient Admission Status: Inpatient  RUR: Readmission Risk Score: 6.4    Hospitalization in the last 30 days (Readmission):  No        Transition of care plan:  Awaiting medical clearance and dc order. Hem/Onc following. GI and Colorectal following. Scope planned for Friday. Pt up ad miriam in room.  Home. No CM needs indicated.   Outpatient follow-up.  Pt's family to transport.

## 2024-09-20 ENCOUNTER — ANESTHESIA (OUTPATIENT)
Facility: HOSPITAL | Age: 49
End: 2024-09-20

## 2024-09-20 ENCOUNTER — ANESTHESIA EVENT (OUTPATIENT)
Facility: HOSPITAL | Age: 49
End: 2024-09-20

## 2024-09-20 ENCOUNTER — APPOINTMENT (OUTPATIENT)
Facility: HOSPITAL | Age: 49
DRG: 330 | End: 2024-09-20

## 2024-09-20 LAB
EKG ATRIAL RATE: 71 BPM
EKG DIAGNOSIS: NORMAL
EKG P AXIS: 127 DEGREES
EKG P-R INTERVAL: 158 MS
EKG Q-T INTERVAL: 378 MS
EKG QRS DURATION: 74 MS
EKG QTC CALCULATION (BAZETT): 410 MS
EKG R AXIS: 173 DEGREES
EKG T AXIS: 146 DEGREES
EKG VENTRICULAR RATE: 71 BPM
HCG UR QL: NEGATIVE
HCT VFR BLD AUTO: 30.2 % (ref 35–47)
HCT VFR BLD AUTO: 32.7 % (ref 35–47)
HGB BLD-MCNC: 8.9 G/DL (ref 11.5–16)
HGB BLD-MCNC: 9.6 G/DL (ref 11.5–16)

## 2024-09-20 PROCEDURE — 6370000000 HC RX 637 (ALT 250 FOR IP): Performed by: INTERNAL MEDICINE

## 2024-09-20 PROCEDURE — 36415 COLL VENOUS BLD VENIPUNCTURE: CPT

## 2024-09-20 PROCEDURE — 2580000003 HC RX 258: Performed by: INTERNAL MEDICINE

## 2024-09-20 PROCEDURE — 85018 HEMOGLOBIN: CPT

## 2024-09-20 PROCEDURE — 6360000002 HC RX W HCPCS: Performed by: NURSE ANESTHETIST, CERTIFIED REGISTERED

## 2024-09-20 PROCEDURE — 81025 URINE PREGNANCY TEST: CPT

## 2024-09-20 PROCEDURE — 7100000011 HC PHASE II RECOVERY - ADDTL 15 MIN: Performed by: INTERNAL MEDICINE

## 2024-09-20 PROCEDURE — 6360000002 HC RX W HCPCS: Performed by: NURSE PRACTITIONER

## 2024-09-20 PROCEDURE — 2580000003 HC RX 258

## 2024-09-20 PROCEDURE — 6360000002 HC RX W HCPCS: Performed by: INTERNAL MEDICINE

## 2024-09-20 PROCEDURE — 0DBN8ZX EXCISION OF SIGMOID COLON, VIA NATURAL OR ARTIFICIAL OPENING ENDOSCOPIC, DIAGNOSTIC: ICD-10-PCS | Performed by: INTERNAL MEDICINE

## 2024-09-20 PROCEDURE — 94761 N-INVAS EAR/PLS OXIMETRY MLT: CPT

## 2024-09-20 PROCEDURE — A9579 GAD-BASE MR CONTRAST NOS,1ML: HCPCS | Performed by: RADIOLOGY

## 2024-09-20 PROCEDURE — 2500000003 HC RX 250 WO HCPCS: Performed by: NURSE ANESTHETIST, CERTIFIED REGISTERED

## 2024-09-20 PROCEDURE — 3700000000 HC ANESTHESIA ATTENDED CARE: Performed by: INTERNAL MEDICINE

## 2024-09-20 PROCEDURE — 88360 TUMOR IMMUNOHISTOCHEM/MANUAL: CPT

## 2024-09-20 PROCEDURE — 6360000004 HC RX CONTRAST MEDICATION: Performed by: RADIOLOGY

## 2024-09-20 PROCEDURE — 3700000001 HC ADD 15 MINUTES (ANESTHESIA): Performed by: INTERNAL MEDICINE

## 2024-09-20 PROCEDURE — 3600007502: Performed by: INTERNAL MEDICINE

## 2024-09-20 PROCEDURE — 6370000000 HC RX 637 (ALT 250 FOR IP)

## 2024-09-20 PROCEDURE — 1100000000 HC RM PRIVATE

## 2024-09-20 PROCEDURE — 85014 HEMATOCRIT: CPT

## 2024-09-20 PROCEDURE — 88305 TISSUE EXAM BY PATHOLOGIST: CPT

## 2024-09-20 PROCEDURE — 3600007512: Performed by: INTERNAL MEDICINE

## 2024-09-20 PROCEDURE — 2709999900 HC NON-CHARGEABLE SUPPLY: Performed by: INTERNAL MEDICINE

## 2024-09-20 PROCEDURE — 72197 MRI PELVIS W/O & W/DYE: CPT

## 2024-09-20 PROCEDURE — 7100000010 HC PHASE II RECOVERY - FIRST 15 MIN: Performed by: INTERNAL MEDICINE

## 2024-09-20 RX ORDER — ONDANSETRON 2 MG/ML
4 INJECTION INTRAMUSCULAR; INTRAVENOUS EVERY 6 HOURS PRN
Status: DISCONTINUED | OUTPATIENT
Start: 2024-09-20 | End: 2024-10-01 | Stop reason: HOSPADM

## 2024-09-20 RX ORDER — SODIUM CHLORIDE 0.9 % (FLUSH) 0.9 %
5-40 SYRINGE (ML) INJECTION PRN
Status: DISCONTINUED | OUTPATIENT
Start: 2024-09-20 | End: 2024-10-01 | Stop reason: HOSPADM

## 2024-09-20 RX ORDER — SODIUM CHLORIDE 0.9 % (FLUSH) 0.9 %
5-40 SYRINGE (ML) INJECTION EVERY 12 HOURS SCHEDULED
Status: DISCONTINUED | OUTPATIENT
Start: 2024-09-20 | End: 2024-09-22

## 2024-09-20 RX ORDER — SODIUM CHLORIDE 0.9 % (FLUSH) 0.9 %
5-40 SYRINGE (ML) INJECTION EVERY 12 HOURS SCHEDULED
Status: DISCONTINUED | OUTPATIENT
Start: 2024-09-20 | End: 2024-09-20 | Stop reason: HOSPADM

## 2024-09-20 RX ORDER — SODIUM CHLORIDE 9 MG/ML
25 INJECTION, SOLUTION INTRAVENOUS PRN
Status: DISCONTINUED | OUTPATIENT
Start: 2024-09-20 | End: 2024-09-20 | Stop reason: HOSPADM

## 2024-09-20 RX ORDER — PROPOFOL 10 MG/ML
INJECTION, EMULSION INTRAVENOUS
Status: DISCONTINUED | OUTPATIENT
Start: 2024-09-20 | End: 2024-09-20 | Stop reason: SDUPTHER

## 2024-09-20 RX ORDER — CIPROFLOXACIN 2 MG/ML
400 INJECTION, SOLUTION INTRAVENOUS EVERY 12 HOURS
Status: DISCONTINUED | OUTPATIENT
Start: 2024-09-20 | End: 2024-09-20

## 2024-09-20 RX ORDER — OXYCODONE HYDROCHLORIDE 5 MG/1
5 TABLET ORAL ONCE
Status: COMPLETED | OUTPATIENT
Start: 2024-09-20 | End: 2024-09-20

## 2024-09-20 RX ORDER — ONDANSETRON 4 MG/1
4 TABLET, ORALLY DISINTEGRATING ORAL EVERY 8 HOURS PRN
Status: DISCONTINUED | OUTPATIENT
Start: 2024-09-20 | End: 2024-10-01 | Stop reason: HOSPADM

## 2024-09-20 RX ORDER — SODIUM CHLORIDE 0.9 % (FLUSH) 0.9 %
5-40 SYRINGE (ML) INJECTION PRN
Status: DISCONTINUED | OUTPATIENT
Start: 2024-09-20 | End: 2024-09-20 | Stop reason: HOSPADM

## 2024-09-20 RX ORDER — SODIUM CHLORIDE 9 MG/ML
INJECTION, SOLUTION INTRAVENOUS PRN
Status: DISCONTINUED | OUTPATIENT
Start: 2024-09-20 | End: 2024-10-01 | Stop reason: HOSPADM

## 2024-09-20 RX ORDER — METRONIDAZOLE 500 MG/100ML
500 INJECTION, SOLUTION INTRAVENOUS EVERY 8 HOURS
Status: DISCONTINUED | OUTPATIENT
Start: 2024-09-20 | End: 2024-09-20

## 2024-09-20 RX ADMIN — HYDROMORPHONE HYDROCHLORIDE 1 MG: 1 INJECTION, SOLUTION INTRAMUSCULAR; INTRAVENOUS; SUBCUTANEOUS at 08:00

## 2024-09-20 RX ADMIN — ONDANSETRON 4 MG: 2 INJECTION INTRAMUSCULAR; INTRAVENOUS at 08:00

## 2024-09-20 RX ADMIN — ACETAMINOPHEN 650 MG: 325 TABLET ORAL at 11:27

## 2024-09-20 RX ADMIN — SODIUM CHLORIDE, PRESERVATIVE FREE 10 ML: 5 INJECTION INTRAVENOUS at 19:56

## 2024-09-20 RX ADMIN — PANTOPRAZOLE SODIUM 40 MG: 40 TABLET, DELAYED RELEASE ORAL at 06:10

## 2024-09-20 RX ADMIN — SODIUM CHLORIDE: 9 INJECTION, SOLUTION INTRAVENOUS at 21:51

## 2024-09-20 RX ADMIN — PROPOFOL 30 MG: 10 INJECTION, EMULSION INTRAVENOUS at 14:45

## 2024-09-20 RX ADMIN — IRON SUCROSE 200 MG: 20 INJECTION, SOLUTION INTRAVENOUS at 17:28

## 2024-09-20 RX ADMIN — SODIUM CHLORIDE: 9 INJECTION, SOLUTION INTRAVENOUS at 06:57

## 2024-09-20 RX ADMIN — OXYCODONE 5 MG: 5 TABLET ORAL at 21:48

## 2024-09-20 RX ADMIN — PIPERACILLIN AND TAZOBACTAM 4500 MG: 4; .5 INJECTION, POWDER, FOR SOLUTION INTRAVENOUS at 16:30

## 2024-09-20 RX ADMIN — PROPOFOL 140 MCG/KG/MIN: 10 INJECTION, EMULSION INTRAVENOUS at 14:40

## 2024-09-20 RX ADMIN — PIPERACILLIN AND TAZOBACTAM 3375 MG: 3; .375 INJECTION, POWDER, LYOPHILIZED, FOR SOLUTION INTRAVENOUS at 21:53

## 2024-09-20 RX ADMIN — SODIUM CHLORIDE, PRESERVATIVE FREE 10 ML: 5 INJECTION INTRAVENOUS at 08:01

## 2024-09-20 RX ADMIN — ACETAMINOPHEN 650 MG: 325 TABLET ORAL at 16:27

## 2024-09-20 RX ADMIN — PROPOFOL 100 MG: 10 INJECTION, EMULSION INTRAVENOUS at 14:41

## 2024-09-20 RX ADMIN — GADOTERIDOL 13 ML: 279.3 INJECTION, SOLUTION INTRAVENOUS at 21:19

## 2024-09-20 RX ADMIN — PANTOPRAZOLE SODIUM 40 MG: 40 TABLET, DELAYED RELEASE ORAL at 16:27

## 2024-09-20 RX ADMIN — SODIUM PHOSPHATE, DIBASIC AND SODIUM PHOSPHATE, MONOBASIC 1 ENEMA: 7; 19 ENEMA RECTAL at 08:05

## 2024-09-20 RX ADMIN — SODIUM CHLORIDE: 9 INJECTION, SOLUTION INTRAVENOUS at 07:39

## 2024-09-20 RX ADMIN — LIDOCAINE HYDROCHLORIDE 50 MG: 20 INJECTION, SOLUTION INFILTRATION; PERINEURAL at 14:41

## 2024-09-20 ASSESSMENT — PAIN DESCRIPTION - LOCATION
LOCATION: ABDOMEN

## 2024-09-20 ASSESSMENT — PAIN SCALES - GENERAL
PAINLEVEL_OUTOF10: 5
PAINLEVEL_OUTOF10: 6
PAINLEVEL_OUTOF10: 1

## 2024-09-20 ASSESSMENT — PAIN DESCRIPTION - ORIENTATION: ORIENTATION: LOWER

## 2024-09-20 ASSESSMENT — PAIN - FUNCTIONAL ASSESSMENT: PAIN_FUNCTIONAL_ASSESSMENT: 0-10

## 2024-09-20 ASSESSMENT — PAIN DESCRIPTION - DESCRIPTORS
DESCRIPTORS: ACHING
DESCRIPTORS: ACHING

## 2024-09-20 NOTE — PERIOP NOTE
TRANSFER - OUT REPORT:    Verbal report given to Fatmata - 4th Floor RN on Charito Singh  being transferred to 4th Floor - Room 413 for routine post-op       Report consisted of patient's Situation, Background, Assessment and   Recommendations(SBAR).     Information from the following report(s) Nurse Handoff Report, Index, Adult Overview, Surgery Report, Intake/Output, MAR, Recent Results, Pre Procedure Checklist, and Neuro Assessment was reviewed with the receiving nurse.           Lines:   Extended Dwell Peripherial IV 09/20/24 Right Cephalic (Active)   Criteria for Appropriate Use Limited/no vessel suitable for conventional peripheral access 09/20/24 1341   Site Assessment Clean, dry & intact 09/20/24 1458   Phlebitis Assessment No symptoms 09/20/24 1458   Infiltration Assessment 0 09/20/24 1458   Line Status Infusing 09/20/24 1458   Line Care Connections checked and tightened 09/20/24 1458   Alcohol Cap Used Yes 09/20/24 1341   Dressing Type Transparent 09/20/24 1458   Dressing Status Clean, dry & intact 09/20/24 1458        Opportunity for questions and clarification was provided.      Patient transported with:  Tech

## 2024-09-20 NOTE — CARE COORDINATION
Transition of care plan - RUR 10%:  Medical management continues  GI following - colonscopy today  Hem/Onc following  CM following- patient is uninsured and will be screened by [employer] for Medicaid; she has been given a [employer] financial assistance form.    Plan is for patient to discharge home with family when medically cleared  Outpatient follow-up  Transportation: friend or [employer]

## 2024-09-20 NOTE — PERIOP NOTE
Received recovery report from anesthesia team, see anesthesia note. Abdomen remains soft and non-tender post-procedure. Pt has no complaints at this time and tolerated procedure well. Endoscope was pre-cleaned at the bedside by Hussein Kulkarni immediately following procedure. Post recovery report given to Jillian Sahu RN.

## 2024-09-21 LAB
HCT VFR BLD AUTO: 26.3 % (ref 35–47)
HGB BLD-MCNC: 7.9 G/DL (ref 11.5–16)

## 2024-09-21 PROCEDURE — 6370000000 HC RX 637 (ALT 250 FOR IP): Performed by: INTERNAL MEDICINE

## 2024-09-21 PROCEDURE — 6360000002 HC RX W HCPCS: Performed by: INTERNAL MEDICINE

## 2024-09-21 PROCEDURE — 2580000003 HC RX 258: Performed by: INTERNAL MEDICINE

## 2024-09-21 PROCEDURE — 36415 COLL VENOUS BLD VENIPUNCTURE: CPT

## 2024-09-21 PROCEDURE — 1100000000 HC RM PRIVATE

## 2024-09-21 PROCEDURE — 2580000003 HC RX 258

## 2024-09-21 PROCEDURE — 94761 N-INVAS EAR/PLS OXIMETRY MLT: CPT

## 2024-09-21 PROCEDURE — 85018 HEMOGLOBIN: CPT

## 2024-09-21 PROCEDURE — 85014 HEMATOCRIT: CPT

## 2024-09-21 RX ADMIN — PIPERACILLIN AND TAZOBACTAM 3375 MG: 3; .375 INJECTION, POWDER, LYOPHILIZED, FOR SOLUTION INTRAVENOUS at 14:34

## 2024-09-21 RX ADMIN — PANTOPRAZOLE SODIUM 40 MG: 40 TABLET, DELAYED RELEASE ORAL at 17:06

## 2024-09-21 RX ADMIN — SODIUM CHLORIDE, PRESERVATIVE FREE 10 ML: 5 INJECTION INTRAVENOUS at 09:58

## 2024-09-21 RX ADMIN — PANTOPRAZOLE SODIUM 40 MG: 40 TABLET, DELAYED RELEASE ORAL at 06:11

## 2024-09-21 RX ADMIN — SODIUM CHLORIDE: 9 INJECTION, SOLUTION INTRAVENOUS at 12:32

## 2024-09-21 RX ADMIN — PIPERACILLIN AND TAZOBACTAM 3375 MG: 3; .375 INJECTION, POWDER, LYOPHILIZED, FOR SOLUTION INTRAVENOUS at 06:12

## 2024-09-21 RX ADMIN — SODIUM CHLORIDE, PRESERVATIVE FREE 10 ML: 5 INJECTION INTRAVENOUS at 20:48

## 2024-09-21 RX ADMIN — PIPERACILLIN AND TAZOBACTAM 3375 MG: 3; .375 INJECTION, POWDER, LYOPHILIZED, FOR SOLUTION INTRAVENOUS at 21:53

## 2024-09-21 ASSESSMENT — PAIN SCALES - GENERAL
PAINLEVEL_OUTOF10: 0
PAINLEVEL_OUTOF10: 0

## 2024-09-22 PROCEDURE — 6360000002 HC RX W HCPCS: Performed by: INTERNAL MEDICINE

## 2024-09-22 PROCEDURE — 2580000003 HC RX 258

## 2024-09-22 PROCEDURE — 2580000003 HC RX 258: Performed by: INTERNAL MEDICINE

## 2024-09-22 PROCEDURE — 1100000000 HC RM PRIVATE

## 2024-09-22 PROCEDURE — 6370000000 HC RX 637 (ALT 250 FOR IP): Performed by: INTERNAL MEDICINE

## 2024-09-22 PROCEDURE — 94761 N-INVAS EAR/PLS OXIMETRY MLT: CPT

## 2024-09-22 RX ADMIN — PIPERACILLIN AND TAZOBACTAM 3375 MG: 3; .375 INJECTION, POWDER, LYOPHILIZED, FOR SOLUTION INTRAVENOUS at 22:18

## 2024-09-22 RX ADMIN — PIPERACILLIN AND TAZOBACTAM 3375 MG: 3; .375 INJECTION, POWDER, LYOPHILIZED, FOR SOLUTION INTRAVENOUS at 14:07

## 2024-09-22 RX ADMIN — SODIUM CHLORIDE: 9 INJECTION, SOLUTION INTRAVENOUS at 02:53

## 2024-09-22 RX ADMIN — PIPERACILLIN AND TAZOBACTAM 3375 MG: 3; .375 INJECTION, POWDER, LYOPHILIZED, FOR SOLUTION INTRAVENOUS at 05:51

## 2024-09-22 RX ADMIN — SODIUM CHLORIDE, PRESERVATIVE FREE 5 ML: 5 INJECTION INTRAVENOUS at 08:20

## 2024-09-22 RX ADMIN — PANTOPRAZOLE SODIUM 40 MG: 40 TABLET, DELAYED RELEASE ORAL at 15:58

## 2024-09-22 RX ADMIN — SODIUM CHLORIDE, PRESERVATIVE FREE 10 ML: 5 INJECTION INTRAVENOUS at 20:12

## 2024-09-22 RX ADMIN — PANTOPRAZOLE SODIUM 40 MG: 40 TABLET, DELAYED RELEASE ORAL at 06:45

## 2024-09-22 ASSESSMENT — PAIN SCALES - GENERAL: PAINLEVEL_OUTOF10: 0

## 2024-09-23 LAB
BASOPHILS # BLD: 0.1 K/UL (ref 0–0.1)
BASOPHILS NFR BLD: 1 % (ref 0–1)
DIFFERENTIAL METHOD BLD: ABNORMAL
EOSINOPHIL # BLD: 0.3 K/UL (ref 0–0.4)
EOSINOPHIL NFR BLD: 3 % (ref 0–7)
ERYTHROCYTE [DISTWIDTH] IN BLOOD BY AUTOMATED COUNT: 25.9 % (ref 11.5–14.5)
HCT VFR BLD AUTO: 28.4 % (ref 35–47)
HCT VFR BLD AUTO: 29 % (ref 35–47)
HGB BLD-MCNC: 8.3 G/DL (ref 11.5–16)
HGB BLD-MCNC: 8.4 G/DL (ref 11.5–16)
IMM GRANULOCYTES # BLD AUTO: 0.1 K/UL (ref 0–0.04)
IMM GRANULOCYTES NFR BLD AUTO: 1 % (ref 0–0.5)
LYMPHOCYTES # BLD: 2 K/UL (ref 0.8–3.5)
LYMPHOCYTES NFR BLD: 21 % (ref 12–49)
MCH RBC QN AUTO: 20.3 PG (ref 26–34)
MCHC RBC AUTO-ENTMCNC: 29.2 G/DL (ref 30–36.5)
MCV RBC AUTO: 69.4 FL (ref 80–99)
MONOCYTES # BLD: 0.8 K/UL (ref 0–1)
MONOCYTES NFR BLD: 8 % (ref 5–13)
NEUTS SEG # BLD: 6.1 K/UL (ref 1.8–8)
NEUTS SEG NFR BLD: 66 % (ref 32–75)
NRBC # BLD: 0 K/UL (ref 0–0.01)
NRBC BLD-RTO: 0 PER 100 WBC
PLATELET # BLD AUTO: 357 K/UL (ref 150–400)
PLATELET COMMENT: ABNORMAL
PMV BLD AUTO: 10.5 FL (ref 8.9–12.9)
RBC # BLD AUTO: 4.09 M/UL (ref 3.8–5.2)
RBC MORPH BLD: ABNORMAL
RBC MORPH BLD: ABNORMAL
WBC # BLD AUTO: 9.4 K/UL (ref 3.6–11)

## 2024-09-23 PROCEDURE — 6360000002 HC RX W HCPCS: Performed by: INTERNAL MEDICINE

## 2024-09-23 PROCEDURE — 1100000000 HC RM PRIVATE

## 2024-09-23 PROCEDURE — 85018 HEMOGLOBIN: CPT

## 2024-09-23 PROCEDURE — 85025 COMPLETE CBC W/AUTO DIFF WBC: CPT

## 2024-09-23 PROCEDURE — 6370000000 HC RX 637 (ALT 250 FOR IP): Performed by: INTERNAL MEDICINE

## 2024-09-23 PROCEDURE — 2580000003 HC RX 258: Performed by: INTERNAL MEDICINE

## 2024-09-23 PROCEDURE — 85014 HEMATOCRIT: CPT

## 2024-09-23 PROCEDURE — 94761 N-INVAS EAR/PLS OXIMETRY MLT: CPT

## 2024-09-23 RX ADMIN — PIPERACILLIN AND TAZOBACTAM 3375 MG: 3; .375 INJECTION, POWDER, LYOPHILIZED, FOR SOLUTION INTRAVENOUS at 14:47

## 2024-09-23 RX ADMIN — PIPERACILLIN AND TAZOBACTAM 3375 MG: 3; .375 INJECTION, POWDER, LYOPHILIZED, FOR SOLUTION INTRAVENOUS at 22:41

## 2024-09-23 RX ADMIN — PIPERACILLIN AND TAZOBACTAM 3375 MG: 3; .375 INJECTION, POWDER, LYOPHILIZED, FOR SOLUTION INTRAVENOUS at 05:46

## 2024-09-23 RX ADMIN — SODIUM CHLORIDE, PRESERVATIVE FREE 10 ML: 5 INJECTION INTRAVENOUS at 20:05

## 2024-09-23 RX ADMIN — PANTOPRAZOLE SODIUM 40 MG: 40 TABLET, DELAYED RELEASE ORAL at 07:22

## 2024-09-23 RX ADMIN — PANTOPRAZOLE SODIUM 40 MG: 40 TABLET, DELAYED RELEASE ORAL at 16:26

## 2024-09-23 NOTE — CARE COORDINATION
9/23/24   1:55 PM      Case Management Daily Progress Note     Received and reviewed handoff from previous CM.      Reason for Admission:      1. Colonic mass    2. Rectal bleeding          RUR: 5%  LOS: 5      Transition of care plan:    1). Specialists consulted: Colon and Rectal surgery, GI  2). PT/OT recommendations: N/A  3). Transportation at dc: Family to transport at dc  4). CM following for dc needs    Discharge Plan:  Home with family    BRANDI Garza  [facility]      Available via [employer]       The patient is a 21y Female complaining of

## 2024-09-24 PROCEDURE — 6370000000 HC RX 637 (ALT 250 FOR IP): Performed by: INTERNAL MEDICINE

## 2024-09-24 PROCEDURE — 6360000002 HC RX W HCPCS: Performed by: INTERNAL MEDICINE

## 2024-09-24 PROCEDURE — 1100000000 HC RM PRIVATE

## 2024-09-24 PROCEDURE — 2580000003 HC RX 258: Performed by: INTERNAL MEDICINE

## 2024-09-24 PROCEDURE — 94761 N-INVAS EAR/PLS OXIMETRY MLT: CPT

## 2024-09-24 PROCEDURE — 6370000000 HC RX 637 (ALT 250 FOR IP): Performed by: COLON & RECTAL SURGERY

## 2024-09-24 RX ORDER — PANTOPRAZOLE SODIUM 40 MG/1
40 TABLET, DELAYED RELEASE ORAL
Status: DISCONTINUED | OUTPATIENT
Start: 2024-09-25 | End: 2024-10-01 | Stop reason: HOSPADM

## 2024-09-24 RX ADMIN — PIPERACILLIN AND TAZOBACTAM 3375 MG: 3; .375 INJECTION, POWDER, LYOPHILIZED, FOR SOLUTION INTRAVENOUS at 06:08

## 2024-09-24 RX ADMIN — SODIUM CHLORIDE, PRESERVATIVE FREE 10 ML: 5 INJECTION INTRAVENOUS at 22:44

## 2024-09-24 RX ADMIN — PANTOPRAZOLE SODIUM 40 MG: 40 TABLET, DELAYED RELEASE ORAL at 06:08

## 2024-09-24 RX ADMIN — POLYETHYLENE GLYCOL 3350, SODIUM SULFATE ANHYDROUS, SODIUM BICARBONATE, SODIUM CHLORIDE, POTASSIUM CHLORIDE 2000 ML: 236; 22.74; 6.74; 5.86; 2.97 POWDER, FOR SOLUTION ORAL at 18:28

## 2024-09-24 RX ADMIN — PIPERACILLIN AND TAZOBACTAM 3375 MG: 3; .375 INJECTION, POWDER, LYOPHILIZED, FOR SOLUTION INTRAVENOUS at 22:44

## 2024-09-24 RX ADMIN — PIPERACILLIN AND TAZOBACTAM 3375 MG: 3; .375 INJECTION, POWDER, LYOPHILIZED, FOR SOLUTION INTRAVENOUS at 15:44

## 2024-09-24 NOTE — WOUND CARE
Wound care RN consulted for \"kalpana abdomen for colostomy, begin teaching\". Patient introduced to wound care role and educated on what the teaching process will look like post op, discussed plan for teaching and review of products with first pouch change being the day after surgery. Patient agreeable and willing to learn.    Colostomy site marked in LLQ and covered with transparent dressing Per patients primary RN, plan is for surgery tomorrow 9/25 afternoon. Will return tomorrow morning for teaching.     Laxmi Yip, RN  [facility]ound [facility]  Available via [employer]

## 2024-09-24 NOTE — CARE COORDINATION
Case Management Progress Note    Discharge Plan:  TBD.  Anticipate discharging to home with no needs vs [facility] pending surgical intervention with Colorectal Sx.      (1)  Uninsured:  [employer] sent.      (2)  If DC with an ostomy:  Will require bedside teaching with patient/family.   Kindly provide ostomy supplies prior to DC (maybe 3 ostomy bags).          (3)  If DC with ostomy:  Kindly provide patient with wound care nurse note including the supplies needed, as patient may have to purchase her own supplies, unless wound care nurse knows of a [facility] application for ostomy supplies.       (4)  Referral sent to [facility] for review, to see if they are able to assist with patient's ostomy care/teaching, being uninsured.        (5)  [facility] schedule provided to patient.  Patient currently does not have a PCP.        (6)  Transportation at DC:  Family is able to assist.      Clinical Updates:  Plan for diverting colostomy this week vs next week, depending on OR schedule.      Will continue to follow.    ___________________________________    LUIS ALBERTO Rogers, RN-CM  [facility]- Care Management  Available via [employer]  9/24/2024  1:47 PM

## 2024-09-25 ENCOUNTER — ANESTHESIA (OUTPATIENT)
Facility: HOSPITAL | Age: 49
End: 2024-09-25

## 2024-09-25 ENCOUNTER — ANESTHESIA EVENT (OUTPATIENT)
Facility: HOSPITAL | Age: 49
End: 2024-09-25

## 2024-09-25 LAB
ABO + RH BLD: NORMAL
ALBUMIN SERPL-MCNC: 2.9 G/DL (ref 3.5–5)
ALBUMIN/GLOB SERPL: 0.8 (ref 1.1–2.2)
ALP SERPL-CCNC: 61 U/L (ref 45–117)
ALT SERPL-CCNC: 17 U/L (ref 12–78)
ANION GAP SERPL CALC-SCNC: 4 MMOL/L (ref 2–12)
AST SERPL-CCNC: 13 U/L (ref 15–37)
BASOPHILS # BLD: 0 K/UL (ref 0–0.1)
BASOPHILS NFR BLD: 0 % (ref 0–1)
BILIRUB SERPL-MCNC: 0.4 MG/DL (ref 0.2–1)
BLOOD GROUP ANTIBODIES SERPL: NORMAL
BUN SERPL-MCNC: 2 MG/DL (ref 6–20)
BUN/CREAT SERPL: 4 (ref 12–20)
CALCIUM SERPL-MCNC: 8.7 MG/DL (ref 8.5–10.1)
CHLORIDE SERPL-SCNC: 112 MMOL/L (ref 97–108)
CO2 SERPL-SCNC: 28 MMOL/L (ref 21–32)
CREAT SERPL-MCNC: 0.5 MG/DL (ref 0.55–1.02)
DIFFERENTIAL METHOD BLD: ABNORMAL
EOSINOPHIL # BLD: 0.2 K/UL (ref 0–0.4)
EOSINOPHIL NFR BLD: 2 % (ref 0–7)
ERYTHROCYTE [DISTWIDTH] IN BLOOD BY AUTOMATED COUNT: 26.8 % (ref 11.5–14.5)
GLOBULIN SER CALC-MCNC: 3.7 G/DL (ref 2–4)
GLUCOSE SERPL-MCNC: 98 MG/DL (ref 65–100)
HCT VFR BLD AUTO: 28.6 % (ref 35–47)
HGB BLD-MCNC: 8.5 G/DL (ref 11.5–16)
IMM GRANULOCYTES # BLD AUTO: 0 K/UL
IMM GRANULOCYTES NFR BLD AUTO: 0 %
LYMPHOCYTES # BLD: 3.3 K/UL (ref 0.8–3.5)
LYMPHOCYTES NFR BLD: 33 % (ref 12–49)
MAGNESIUM SERPL-MCNC: 2.1 MG/DL (ref 1.6–2.4)
MCH RBC QN AUTO: 20.8 PG (ref 26–34)
MCHC RBC AUTO-ENTMCNC: 29.7 G/DL (ref 30–36.5)
MCV RBC AUTO: 69.9 FL (ref 80–99)
MONOCYTES # BLD: 0.8 K/UL (ref 0–1)
MONOCYTES NFR BLD: 8 % (ref 5–13)
NEUTS SEG # BLD: 5.7 K/UL (ref 1.8–8)
NEUTS SEG NFR BLD: 57 % (ref 32–75)
NRBC # BLD: 0 K/UL (ref 0–0.01)
NRBC BLD-RTO: 0 PER 100 WBC
PLATELET # BLD AUTO: 389 K/UL (ref 150–400)
PLATELET COMMENT: ABNORMAL
PMV BLD AUTO: 10.7 FL (ref 8.9–12.9)
POTASSIUM SERPL-SCNC: 3 MMOL/L (ref 3.5–5.1)
PROT SERPL-MCNC: 6.6 G/DL (ref 6.4–8.2)
RBC # BLD AUTO: 4.09 M/UL (ref 3.8–5.2)
RBC MORPH BLD: ABNORMAL
SODIUM SERPL-SCNC: 144 MMOL/L (ref 136–145)
SPECIMEN EXP DATE BLD: NORMAL
WBC # BLD AUTO: 10 K/UL (ref 3.6–11)
WBC MORPH BLD: ABNORMAL

## 2024-09-25 PROCEDURE — 6360000002 HC RX W HCPCS: Performed by: ANESTHESIOLOGY

## 2024-09-25 PROCEDURE — 3E0M45Z INTRODUCTION OF ADHESION BARRIER INTO PERITONEAL CAVITY, PERCUTANEOUS ENDOSCOPIC APPROACH: ICD-10-PCS | Performed by: COLON & RECTAL SURGERY

## 2024-09-25 PROCEDURE — C9290 INJ, BUPIVACAINE LIPOSOME: HCPCS | Performed by: COLON & RECTAL SURGERY

## 2024-09-25 PROCEDURE — 83735 ASSAY OF MAGNESIUM: CPT

## 2024-09-25 PROCEDURE — 2709999900 HC NON-CHARGEABLE SUPPLY: Performed by: COLON & RECTAL SURGERY

## 2024-09-25 PROCEDURE — C1765 ADHESION BARRIER: HCPCS | Performed by: COLON & RECTAL SURGERY

## 2024-09-25 PROCEDURE — 86850 RBC ANTIBODY SCREEN: CPT

## 2024-09-25 PROCEDURE — 6360000002 HC RX W HCPCS: Performed by: INTERNAL MEDICINE

## 2024-09-25 PROCEDURE — 6370000000 HC RX 637 (ALT 250 FOR IP): Performed by: ANESTHESIOLOGY

## 2024-09-25 PROCEDURE — 6360000002 HC RX W HCPCS: Performed by: NURSE ANESTHETIST, CERTIFIED REGISTERED

## 2024-09-25 PROCEDURE — 80053 COMPREHEN METABOLIC PANEL: CPT

## 2024-09-25 PROCEDURE — 6370000000 HC RX 637 (ALT 250 FOR IP): Performed by: FAMILY MEDICINE

## 2024-09-25 PROCEDURE — 86901 BLOOD TYPING SEROLOGIC RH(D): CPT

## 2024-09-25 PROCEDURE — 86900 BLOOD TYPING SEROLOGIC ABO: CPT

## 2024-09-25 PROCEDURE — 3600000004 HC SURGERY LEVEL 4 BASE: Performed by: COLON & RECTAL SURGERY

## 2024-09-25 PROCEDURE — P9045 ALBUMIN (HUMAN), 5%, 250 ML: HCPCS | Performed by: NURSE ANESTHETIST, CERTIFIED REGISTERED

## 2024-09-25 PROCEDURE — 2580000003 HC RX 258: Performed by: INTERNAL MEDICINE

## 2024-09-25 PROCEDURE — 36415 COLL VENOUS BLD VENIPUNCTURE: CPT

## 2024-09-25 PROCEDURE — 6360000002 HC RX W HCPCS: Performed by: COLON & RECTAL SURGERY

## 2024-09-25 PROCEDURE — 0D1L4Z4 BYPASS TRANSVERSE COLON TO CUTANEOUS, PERCUTANEOUS ENDOSCOPIC APPROACH: ICD-10-PCS | Performed by: COLON & RECTAL SURGERY

## 2024-09-25 PROCEDURE — 6370000000 HC RX 637 (ALT 250 FOR IP): Performed by: COLON & RECTAL SURGERY

## 2024-09-25 PROCEDURE — 2500000003 HC RX 250 WO HCPCS: Performed by: NURSE ANESTHETIST, CERTIFIED REGISTERED

## 2024-09-25 PROCEDURE — 3600000014 HC SURGERY LEVEL 4 ADDTL 15MIN: Performed by: COLON & RECTAL SURGERY

## 2024-09-25 PROCEDURE — 2580000003 HC RX 258: Performed by: NURSE ANESTHETIST, CERTIFIED REGISTERED

## 2024-09-25 PROCEDURE — 1100000000 HC RM PRIVATE

## 2024-09-25 PROCEDURE — 7100000000 HC PACU RECOVERY - FIRST 15 MIN: Performed by: COLON & RECTAL SURGERY

## 2024-09-25 PROCEDURE — 85025 COMPLETE CBC W/AUTO DIFF WBC: CPT

## 2024-09-25 PROCEDURE — 7100000001 HC PACU RECOVERY - ADDTL 15 MIN: Performed by: COLON & RECTAL SURGERY

## 2024-09-25 PROCEDURE — 3700000000 HC ANESTHESIA ATTENDED CARE: Performed by: COLON & RECTAL SURGERY

## 2024-09-25 PROCEDURE — 2580000003 HC RX 258: Performed by: COLON & RECTAL SURGERY

## 2024-09-25 PROCEDURE — 3700000001 HC ADD 15 MINUTES (ANESTHESIA): Performed by: COLON & RECTAL SURGERY

## 2024-09-25 PROCEDURE — 2720000010 HC SURG SUPPLY STERILE: Performed by: COLON & RECTAL SURGERY

## 2024-09-25 RX ORDER — MAGNESIUM SULFATE IN WATER 40 MG/ML
INJECTION, SOLUTION INTRAVENOUS
Status: DISCONTINUED | OUTPATIENT
Start: 2024-09-25 | End: 2024-09-25 | Stop reason: SDUPTHER

## 2024-09-25 RX ORDER — HYDROMORPHONE HYDROCHLORIDE 1 MG/ML
1 INJECTION, SOLUTION INTRAMUSCULAR; INTRAVENOUS; SUBCUTANEOUS EVERY 5 MIN PRN
Status: DISCONTINUED | OUTPATIENT
Start: 2024-09-25 | End: 2024-09-25 | Stop reason: HOSPADM

## 2024-09-25 RX ORDER — SODIUM CHLORIDE, SODIUM LACTATE, POTASSIUM CHLORIDE, CALCIUM CHLORIDE 600; 310; 30; 20 MG/100ML; MG/100ML; MG/100ML; MG/100ML
INJECTION, SOLUTION INTRAVENOUS CONTINUOUS
Status: DISCONTINUED | OUTPATIENT
Start: 2024-09-25 | End: 2024-09-25 | Stop reason: HOSPADM

## 2024-09-25 RX ORDER — DIPHENHYDRAMINE HYDROCHLORIDE 50 MG/ML
12.5 INJECTION INTRAMUSCULAR; INTRAVENOUS
Status: DISCONTINUED | OUTPATIENT
Start: 2024-09-25 | End: 2024-09-25 | Stop reason: HOSPADM

## 2024-09-25 RX ORDER — OXYCODONE HYDROCHLORIDE 5 MG/1
10 TABLET ORAL EVERY 4 HOURS PRN
Status: DISCONTINUED | OUTPATIENT
Start: 2024-09-25 | End: 2024-10-01 | Stop reason: HOSPADM

## 2024-09-25 RX ORDER — IPRATROPIUM BROMIDE AND ALBUTEROL SULFATE 2.5; .5 MG/3ML; MG/3ML
1 SOLUTION RESPIRATORY (INHALATION)
Status: DISCONTINUED | OUTPATIENT
Start: 2024-09-25 | End: 2024-09-25 | Stop reason: HOSPADM

## 2024-09-25 RX ORDER — OXYCODONE HYDROCHLORIDE 5 MG/1
5 TABLET ORAL EVERY 4 HOURS PRN
Status: DISCONTINUED | OUTPATIENT
Start: 2024-09-25 | End: 2024-10-01 | Stop reason: HOSPADM

## 2024-09-25 RX ORDER — DEXAMETHASONE SODIUM PHOSPHATE 4 MG/ML
INJECTION, SOLUTION INTRA-ARTICULAR; INTRALESIONAL; INTRAMUSCULAR; INTRAVENOUS; SOFT TISSUE
Status: DISCONTINUED | OUTPATIENT
Start: 2024-09-25 | End: 2024-09-25 | Stop reason: SDUPTHER

## 2024-09-25 RX ORDER — ALBUTEROL SULFATE 0.83 MG/ML
2.5 SOLUTION RESPIRATORY (INHALATION)
Status: DISCONTINUED | OUTPATIENT
Start: 2024-09-25 | End: 2024-09-25 | Stop reason: HOSPADM

## 2024-09-25 RX ORDER — ACETAMINOPHEN 325 MG/1
650 TABLET ORAL ONCE
Status: COMPLETED | OUTPATIENT
Start: 2024-09-25 | End: 2024-09-25

## 2024-09-25 RX ORDER — SODIUM CHLORIDE, SODIUM LACTATE, POTASSIUM CHLORIDE, CALCIUM CHLORIDE 600; 310; 30; 20 MG/100ML; MG/100ML; MG/100ML; MG/100ML
INJECTION, SOLUTION INTRAVENOUS
Status: DISCONTINUED | OUTPATIENT
Start: 2024-09-25 | End: 2024-09-25 | Stop reason: SDUPTHER

## 2024-09-25 RX ORDER — NALOXONE HYDROCHLORIDE 0.4 MG/ML
INJECTION, SOLUTION INTRAMUSCULAR; INTRAVENOUS; SUBCUTANEOUS PRN
Status: DISCONTINUED | OUTPATIENT
Start: 2024-09-25 | End: 2024-09-25 | Stop reason: HOSPADM

## 2024-09-25 RX ORDER — LABETALOL HYDROCHLORIDE 5 MG/ML
10 INJECTION, SOLUTION INTRAVENOUS
Status: DISCONTINUED | OUTPATIENT
Start: 2024-09-25 | End: 2024-09-25 | Stop reason: HOSPADM

## 2024-09-25 RX ORDER — DEXMEDETOMIDINE HYDROCHLORIDE 100 UG/ML
INJECTION, SOLUTION INTRAVENOUS
Status: DISCONTINUED | OUTPATIENT
Start: 2024-09-25 | End: 2024-09-25 | Stop reason: SDUPTHER

## 2024-09-25 RX ORDER — FAMOTIDINE 10 MG/ML
INJECTION, SOLUTION INTRAVENOUS
Status: DISCONTINUED | OUTPATIENT
Start: 2024-09-25 | End: 2024-09-25 | Stop reason: SDUPTHER

## 2024-09-25 RX ORDER — FENTANYL CITRATE 50 UG/ML
INJECTION, SOLUTION INTRAMUSCULAR; INTRAVENOUS
Status: DISCONTINUED | OUTPATIENT
Start: 2024-09-25 | End: 2024-09-25 | Stop reason: SDUPTHER

## 2024-09-25 RX ORDER — OXYCODONE HYDROCHLORIDE 5 MG/1
5 TABLET ORAL
Status: DISCONTINUED | OUTPATIENT
Start: 2024-09-25 | End: 2024-09-25 | Stop reason: HOSPADM

## 2024-09-25 RX ORDER — LIDOCAINE HYDROCHLORIDE 10 MG/ML
1 INJECTION, SOLUTION EPIDURAL; INFILTRATION; INTRACAUDAL; PERINEURAL
Status: DISCONTINUED | OUTPATIENT
Start: 2024-09-25 | End: 2024-09-25 | Stop reason: HOSPADM

## 2024-09-25 RX ORDER — MEPERIDINE HYDROCHLORIDE 25 MG/ML
12.5 INJECTION INTRAMUSCULAR; INTRAVENOUS; SUBCUTANEOUS EVERY 5 MIN PRN
Status: DISCONTINUED | OUTPATIENT
Start: 2024-09-25 | End: 2024-09-25 | Stop reason: HOSPADM

## 2024-09-25 RX ORDER — SUCCINYLCHOLINE CHLORIDE 20 MG/ML
INJECTION INTRAMUSCULAR; INTRAVENOUS
Status: DISCONTINUED | OUTPATIENT
Start: 2024-09-25 | End: 2024-09-25 | Stop reason: SDUPTHER

## 2024-09-25 RX ORDER — DROPERIDOL 2.5 MG/ML
0.62 INJECTION, SOLUTION INTRAMUSCULAR; INTRAVENOUS
Status: DISCONTINUED | OUTPATIENT
Start: 2024-09-25 | End: 2024-09-25 | Stop reason: HOSPADM

## 2024-09-25 RX ORDER — MIDAZOLAM HYDROCHLORIDE 1 MG/ML
INJECTION INTRAMUSCULAR; INTRAVENOUS
Status: DISCONTINUED | OUTPATIENT
Start: 2024-09-25 | End: 2024-09-25 | Stop reason: SDUPTHER

## 2024-09-25 RX ORDER — ROCURONIUM BROMIDE 10 MG/ML
INJECTION, SOLUTION INTRAVENOUS
Status: DISCONTINUED | OUTPATIENT
Start: 2024-09-25 | End: 2024-09-25 | Stop reason: SDUPTHER

## 2024-09-25 RX ORDER — PROPOFOL 10 MG/ML
INJECTION, EMULSION INTRAVENOUS
Status: DISCONTINUED | OUTPATIENT
Start: 2024-09-25 | End: 2024-09-25 | Stop reason: SDUPTHER

## 2024-09-25 RX ORDER — PHENYLEPHRINE HCL IN 0.9% NACL 0.4MG/10ML
SYRINGE (ML) INTRAVENOUS
Status: DISCONTINUED | OUTPATIENT
Start: 2024-09-25 | End: 2024-09-25 | Stop reason: SDUPTHER

## 2024-09-25 RX ORDER — ONDANSETRON 2 MG/ML
INJECTION INTRAMUSCULAR; INTRAVENOUS
Status: DISCONTINUED | OUTPATIENT
Start: 2024-09-25 | End: 2024-09-25 | Stop reason: SDUPTHER

## 2024-09-25 RX ORDER — ALBUMIN, HUMAN INJ 5% 5 %
SOLUTION INTRAVENOUS
Status: DISCONTINUED | OUTPATIENT
Start: 2024-09-25 | End: 2024-09-25 | Stop reason: SDUPTHER

## 2024-09-25 RX ADMIN — MAGNESIUM SULFATE HEPTAHYDRATE 2000 MG: 40 INJECTION, SOLUTION INTRAVENOUS at 16:32

## 2024-09-25 RX ADMIN — ACETAMINOPHEN 650 MG: 325 TABLET ORAL at 14:55

## 2024-09-25 RX ADMIN — SUCCINYLCHOLINE CHLORIDE 100 MG: 20 INJECTION, SOLUTION INTRAMUSCULAR; INTRAVENOUS at 15:57

## 2024-09-25 RX ADMIN — FENTANYL CITRATE 50 MCG: 50 INJECTION, SOLUTION INTRAMUSCULAR; INTRAVENOUS at 16:43

## 2024-09-25 RX ADMIN — PIPERACILLIN AND TAZOBACTAM 3375 MG: 3; .375 INJECTION, POWDER, LYOPHILIZED, FOR SOLUTION INTRAVENOUS at 06:18

## 2024-09-25 RX ADMIN — DEXMEDETOMIDINE 4 MCG: 100 INJECTION, SOLUTION INTRAVENOUS at 15:45

## 2024-09-25 RX ADMIN — SODIUM CHLORIDE, PRESERVATIVE FREE 10 ML: 5 INJECTION INTRAVENOUS at 22:08

## 2024-09-25 RX ADMIN — MIDAZOLAM HYDROCHLORIDE 2 MG: 1 INJECTION, SOLUTION INTRAMUSCULAR; INTRAVENOUS at 15:45

## 2024-09-25 RX ADMIN — SODIUM CHLORIDE, SODIUM LACTATE, POTASSIUM CHLORIDE, CALCIUM CHLORIDE: 600; 310; 30; 20 INJECTION, SOLUTION INTRAVENOUS at 15:45

## 2024-09-25 RX ADMIN — LIDOCAINE HYDROCHLORIDE 40 MG: 20 INJECTION, SOLUTION EPIDURAL; INFILTRATION; INTRACAUDAL; PERINEURAL at 15:55

## 2024-09-25 RX ADMIN — ROCURONIUM BROMIDE 40 MG: 10 INJECTION, SOLUTION INTRAVENOUS at 16:06

## 2024-09-25 RX ADMIN — Medication 10 MG: at 18:27

## 2024-09-25 RX ADMIN — PROPOFOL 50 MCG/KG/MIN: 10 INJECTION, EMULSION INTRAVENOUS at 18:15

## 2024-09-25 RX ADMIN — Medication 80 MCG: at 17:35

## 2024-09-25 RX ADMIN — OXYCODONE 10 MG: 5 TABLET ORAL at 22:27

## 2024-09-25 RX ADMIN — FAMOTIDINE 20 MG: 10 INJECTION INTRAVENOUS at 15:46

## 2024-09-25 RX ADMIN — DEXAMETHASONE SODIUM PHOSPHATE 8 MG: 4 INJECTION INTRA-ARTICULAR; INTRALESIONAL; INTRAMUSCULAR; INTRAVENOUS; SOFT TISSUE at 16:15

## 2024-09-25 RX ADMIN — PIPERACILLIN AND TAZOBACTAM 3375 MG: 3; .375 INJECTION, POWDER, LYOPHILIZED, FOR SOLUTION INTRAVENOUS at 15:12

## 2024-09-25 RX ADMIN — PROPOFOL 50 MCG/KG/MIN: 10 INJECTION, EMULSION INTRAVENOUS at 15:55

## 2024-09-25 RX ADMIN — FENTANYL CITRATE 50 MCG: 50 INJECTION, SOLUTION INTRAMUSCULAR; INTRAVENOUS at 15:55

## 2024-09-25 RX ADMIN — SODIUM CHLORIDE, POTASSIUM CHLORIDE, SODIUM LACTATE AND CALCIUM CHLORIDE: 600; 310; 30; 20 INJECTION, SOLUTION INTRAVENOUS at 16:03

## 2024-09-25 RX ADMIN — ROCURONIUM BROMIDE 10 MG: 10 INJECTION, SOLUTION INTRAVENOUS at 15:56

## 2024-09-25 RX ADMIN — ALBUMIN (HUMAN) 12.5 G: 12.5 INJECTION, SOLUTION INTRAVENOUS at 16:03

## 2024-09-25 RX ADMIN — DEXMEDETOMIDINE 4 MCG: 100 INJECTION, SOLUTION INTRAVENOUS at 15:47

## 2024-09-25 RX ADMIN — Medication 30 MG: at 16:42

## 2024-09-25 RX ADMIN — Medication 40 MCG: at 17:30

## 2024-09-25 RX ADMIN — ONDANSETRON 4 MG: 2 INJECTION INTRAMUSCULAR; INTRAVENOUS at 15:46

## 2024-09-25 RX ADMIN — ROCURONIUM BROMIDE 20 MG: 10 INJECTION, SOLUTION INTRAVENOUS at 18:46

## 2024-09-25 RX ADMIN — PIPERACILLIN AND TAZOBACTAM 3375 MG: 3; .375 INJECTION, POWDER, LYOPHILIZED, FOR SOLUTION INTRAVENOUS at 22:09

## 2024-09-25 RX ADMIN — PROPOFOL 100 MG: 10 INJECTION, EMULSION INTRAVENOUS at 15:56

## 2024-09-25 RX ADMIN — SUGAMMADEX 400 MG: 100 INJECTION, SOLUTION INTRAVENOUS at 19:58

## 2024-09-25 RX ADMIN — HYDROMORPHONE HYDROCHLORIDE 0.5 MG: 1 INJECTION, SOLUTION INTRAMUSCULAR; INTRAVENOUS; SUBCUTANEOUS at 21:13

## 2024-09-25 RX ADMIN — ROCURONIUM BROMIDE 10 MG: 10 INJECTION, SOLUTION INTRAVENOUS at 17:53

## 2024-09-25 ASSESSMENT — PAIN - FUNCTIONAL ASSESSMENT: PAIN_FUNCTIONAL_ASSESSMENT: 0-10

## 2024-09-25 ASSESSMENT — PAIN DESCRIPTION - LOCATION
LOCATION: ABDOMEN
LOCATION: ABDOMEN

## 2024-09-25 ASSESSMENT — PAIN SCALES - GENERAL
PAINLEVEL_OUTOF10: 10
PAINLEVEL_OUTOF10: 5
PAINLEVEL_OUTOF10: 0

## 2024-09-25 ASSESSMENT — PAIN SCALES - WONG BAKER: WONGBAKER_NUMERICALRESPONSE: NO HURT

## 2024-09-25 NOTE — BRIEF OP NOTE
Brief Postoperative Note      Patient: Charito Singh  YOB: 1975  MRN: [id number]    Date of Procedure: 9/25/2024    Pre-Op Diagnosis Codes:      * Rectal cancer (HCC) [C20]    Post-Op Diagnosis: Same       Procedure(s):  LAPAROSCOPIC DIVERTING COLOSTOMY    Surgeon(s):  Srinivas Smith MD    Assistant:  Surgical Assistant: Maddie Langley    Anesthesia: General    Estimated Blood Loss (mL): less than 50     Complications: None    Specimens:   No specimen, no tissue removed    Implants:  * No implants in log *      Drains:   Colostomy RUQ (Active)   Stomal Appliance 2 piece 09/25/24 1932   Peristomal Assessment Clean, dry & intact;Pink 09/25/24 1932       Urinary Catheter 09/25/24 2 Way;[place]quin (Active)   $ Urethral catheter insertion Inserted for procedure 09/25/24 1625   Urine Color Yellow 09/25/24 1625   Urine Appearance Clear 09/25/24 1625   Collection Container Standard 09/25/24 1625   Securement Method Tape 09/25/24 1625   Catheter Best Practices  Drainage tube clipped to bed;Tamper seal intact;Bag below bladder;Bag not on floor;Lack of dependent loop in tubing;Drainage bag less than half full 09/25/24 1625   Status Draining;Patent 09/25/24 1625       [REMOVED] NG/OG/NJ/NE Tube Orogastric 16 fr Left mouth (Removed)       Findings:  Infection Present At Time Of Surgery (PATOS) (choose all levels that have infection present):  No infection present  Other Findings: no free fluid, no obvious peritoneal carcinomatosis. Unable to deliver proximal sigmoid/distal descending colon to left abdomen preoperative marked colostomy site. Therefore, a loop of proximal transverse colon was delivered to RUQ trocar site to serve as diverting ostomy  This procedure was not performed to treat colon cancer through resection    Electronically signed by Srinivas Smith MD on 9/25/2024 at 7:54 PM

## 2024-09-25 NOTE — CARE COORDINATION
9/25/24   10:26 AM      Case Management Daily Progress Note     Received and reviewed handoff from previous CM.      Reason for Admission:      1. Colonic mass    2. Rectal bleeding          RUR: 8%  LOS: 7      Transition of care plan:    1). Surgery planned for today for ostomy  2). PT/OT recommendations: None- patient is independent   3). Transportation at dc: Family to transport   4). CM following for dc needs    -[facility] October schedule provided to patient    -[facility] declined- unable to accept for ostomy teaching    -Wound nurse has been consulted and has started working with the patient on teaching- patient will need list of supplies needed as she will have to private pay for the supplies as  cannot accept    -At dc, patient will need ostomy supplies (3 ostomy bags)    BRANDI Garza  [facility]      Available via [employer]

## 2024-09-26 ENCOUNTER — TELEPHONE (OUTPATIENT)
Age: 49
End: 2024-09-26

## 2024-09-26 LAB
ALBUMIN SERPL-MCNC: 3.1 G/DL (ref 3.5–5)
ALBUMIN/GLOB SERPL: 0.8 (ref 1.1–2.2)
ALP SERPL-CCNC: 62 U/L (ref 45–117)
ALT SERPL-CCNC: 19 U/L (ref 12–78)
ANION GAP SERPL CALC-SCNC: 5 MMOL/L (ref 2–12)
AST SERPL-CCNC: 14 U/L (ref 15–37)
BASOPHILS # BLD: 0 K/UL (ref 0–0.1)
BASOPHILS NFR BLD: 0 % (ref 0–1)
BILIRUB SERPL-MCNC: 0.7 MG/DL (ref 0.2–1)
BUN SERPL-MCNC: 5 MG/DL (ref 6–20)
BUN/CREAT SERPL: 10 (ref 12–20)
CALCIUM SERPL-MCNC: 8.9 MG/DL (ref 8.5–10.1)
CHLORIDE SERPL-SCNC: 109 MMOL/L (ref 97–108)
CO2 SERPL-SCNC: 26 MMOL/L (ref 21–32)
CREAT SERPL-MCNC: 0.48 MG/DL (ref 0.55–1.02)
DIFFERENTIAL METHOD BLD: ABNORMAL
EOSINOPHIL # BLD: 0 K/UL (ref 0–0.4)
EOSINOPHIL NFR BLD: 0 % (ref 0–7)
ERYTHROCYTE [DISTWIDTH] IN BLOOD BY AUTOMATED COUNT: 27.8 % (ref 11.5–14.5)
GLOBULIN SER CALC-MCNC: 3.8 G/DL (ref 2–4)
GLUCOSE SERPL-MCNC: 101 MG/DL (ref 65–100)
HCT VFR BLD AUTO: 28.1 % (ref 35–47)
HGB BLD-MCNC: 8.4 G/DL (ref 11.5–16)
IMM GRANULOCYTES # BLD AUTO: 0.1 K/UL (ref 0–0.04)
IMM GRANULOCYTES NFR BLD AUTO: 1 % (ref 0–0.5)
LYMPHOCYTES # BLD: 1.4 K/UL (ref 0.8–3.5)
LYMPHOCYTES NFR BLD: 10 % (ref 12–49)
MCH RBC QN AUTO: 21 PG (ref 26–34)
MCHC RBC AUTO-ENTMCNC: 29.9 G/DL (ref 30–36.5)
MCV RBC AUTO: 70.3 FL (ref 80–99)
MONOCYTES # BLD: 0.7 K/UL (ref 0–1)
MONOCYTES NFR BLD: 5 % (ref 5–13)
NEUTS SEG # BLD: 11.9 K/UL (ref 1.8–8)
NEUTS SEG NFR BLD: 84 % (ref 32–75)
NRBC # BLD: 0 K/UL (ref 0–0.01)
NRBC BLD-RTO: 0 PER 100 WBC
PLATELET # BLD AUTO: 398 K/UL (ref 150–400)
PMV BLD AUTO: 10.8 FL (ref 8.9–12.9)
POTASSIUM SERPL-SCNC: 3.6 MMOL/L (ref 3.5–5.1)
PROT SERPL-MCNC: 6.9 G/DL (ref 6.4–8.2)
RBC # BLD AUTO: 4 M/UL (ref 3.8–5.2)
RBC MORPH BLD: ABNORMAL
SODIUM SERPL-SCNC: 140 MMOL/L (ref 136–145)
WBC # BLD AUTO: 14.1 K/UL (ref 3.6–11)
WBC MORPH BLD: ABNORMAL

## 2024-09-26 PROCEDURE — 6360000002 HC RX W HCPCS: Performed by: COLON & RECTAL SURGERY

## 2024-09-26 PROCEDURE — 85025 COMPLETE CBC W/AUTO DIFF WBC: CPT

## 2024-09-26 PROCEDURE — 2500000003 HC RX 250 WO HCPCS: Performed by: COLON & RECTAL SURGERY

## 2024-09-26 PROCEDURE — 6370000000 HC RX 637 (ALT 250 FOR IP): Performed by: COLON & RECTAL SURGERY

## 2024-09-26 PROCEDURE — 36415 COLL VENOUS BLD VENIPUNCTURE: CPT

## 2024-09-26 PROCEDURE — 2700000000 HC OXYGEN THERAPY PER DAY

## 2024-09-26 PROCEDURE — 94761 N-INVAS EAR/PLS OXIMETRY MLT: CPT

## 2024-09-26 PROCEDURE — 2580000003 HC RX 258: Performed by: COLON & RECTAL SURGERY

## 2024-09-26 PROCEDURE — 1100000000 HC RM PRIVATE

## 2024-09-26 PROCEDURE — 80053 COMPREHEN METABOLIC PANEL: CPT

## 2024-09-26 RX ORDER — DEXTROSE MONOHYDRATE, SODIUM CHLORIDE, AND POTASSIUM CHLORIDE 50; 1.49; 4.5 G/1000ML; G/1000ML; G/1000ML
INJECTION, SOLUTION INTRAVENOUS CONTINUOUS
Status: DISCONTINUED | OUTPATIENT
Start: 2024-09-26 | End: 2024-09-30

## 2024-09-26 RX ADMIN — SODIUM CHLORIDE, PRESERVATIVE FREE 10 ML: 5 INJECTION INTRAVENOUS at 20:33

## 2024-09-26 RX ADMIN — OXYCODONE 10 MG: 5 TABLET ORAL at 21:55

## 2024-09-26 RX ADMIN — HYDROMORPHONE HYDROCHLORIDE 0.5 MG: 1 INJECTION, SOLUTION INTRAMUSCULAR; INTRAVENOUS; SUBCUTANEOUS at 15:16

## 2024-09-26 RX ADMIN — HYDROMORPHONE HYDROCHLORIDE 0.5 MG: 1 INJECTION, SOLUTION INTRAMUSCULAR; INTRAVENOUS; SUBCUTANEOUS at 04:08

## 2024-09-26 RX ADMIN — SODIUM CHLORIDE, PRESERVATIVE FREE 10 ML: 5 INJECTION INTRAVENOUS at 08:53

## 2024-09-26 RX ADMIN — POTASSIUM CHLORIDE, DEXTROSE MONOHYDRATE AND SODIUM CHLORIDE: 150; 5; 450 INJECTION, SOLUTION INTRAVENOUS at 07:16

## 2024-09-26 RX ADMIN — OXYCODONE HYDROCHLORIDE 5 MG: 5 TABLET ORAL at 07:04

## 2024-09-26 RX ADMIN — HYDROMORPHONE HYDROCHLORIDE 0.5 MG: 1 INJECTION, SOLUTION INTRAMUSCULAR; INTRAVENOUS; SUBCUTANEOUS at 20:31

## 2024-09-26 RX ADMIN — PANTOPRAZOLE SODIUM 40 MG: 40 TABLET, DELAYED RELEASE ORAL at 07:04

## 2024-09-26 RX ADMIN — HYDROMORPHONE HYDROCHLORIDE 0.5 MG: 1 INJECTION, SOLUTION INTRAMUSCULAR; INTRAVENOUS; SUBCUTANEOUS at 09:48

## 2024-09-26 RX ADMIN — OXYCODONE HYDROCHLORIDE 5 MG: 5 TABLET ORAL at 13:11

## 2024-09-26 RX ADMIN — PIPERACILLIN AND TAZOBACTAM 3375 MG: 3; .375 INJECTION, POWDER, LYOPHILIZED, FOR SOLUTION INTRAVENOUS at 07:00

## 2024-09-26 RX ADMIN — OXYCODONE HYDROCHLORIDE 5 MG: 5 TABLET ORAL at 18:05

## 2024-09-26 RX ADMIN — POTASSIUM CHLORIDE, DEXTROSE MONOHYDRATE AND SODIUM CHLORIDE: 150; 5; 450 INJECTION, SOLUTION INTRAVENOUS at 21:32

## 2024-09-26 RX ADMIN — PIPERACILLIN AND TAZOBACTAM 3375 MG: 3; .375 INJECTION, POWDER, LYOPHILIZED, FOR SOLUTION INTRAVENOUS at 21:56

## 2024-09-26 RX ADMIN — PIPERACILLIN AND TAZOBACTAM 3375 MG: 3; .375 INJECTION, POWDER, LYOPHILIZED, FOR SOLUTION INTRAVENOUS at 13:21

## 2024-09-26 ASSESSMENT — PAIN DESCRIPTION - LOCATION
LOCATION: ABDOMEN

## 2024-09-26 ASSESSMENT — PAIN DESCRIPTION - ORIENTATION
ORIENTATION: ANTERIOR
ORIENTATION: RIGHT;UPPER;ANTERIOR
ORIENTATION: ANTERIOR
ORIENTATION: ANTERIOR
ORIENTATION: UPPER
ORIENTATION: ANTERIOR

## 2024-09-26 ASSESSMENT — PAIN SCALES - GENERAL
PAINLEVEL_OUTOF10: 9
PAINLEVEL_OUTOF10: 7
PAINLEVEL_OUTOF10: 8
PAINLEVEL_OUTOF10: 5
PAINLEVEL_OUTOF10: 5
PAINLEVEL_OUTOF10: 7
PAINLEVEL_OUTOF10: 4
PAINLEVEL_OUTOF10: 5
PAINLEVEL_OUTOF10: 10
PAINLEVEL_OUTOF10: 5
PAINLEVEL_OUTOF10: 8

## 2024-09-26 ASSESSMENT — PAIN DESCRIPTION - DESCRIPTORS
DESCRIPTORS: ACHING
DESCRIPTORS: TENDER
DESCRIPTORS: ACHING;SORE
DESCRIPTORS: ACHING
DESCRIPTORS: ACHING
DESCRIPTORS: DISCOMFORT
DESCRIPTORS: ACHING;DISCOMFORT

## 2024-09-26 ASSESSMENT — PAIN - FUNCTIONAL ASSESSMENT: PAIN_FUNCTIONAL_ASSESSMENT: ACTIVITIES ARE NOT PREVENTED

## 2024-09-26 NOTE — WOUND CARE
Ostomy Progress Note:  First postoperative visit.  Type of Ostomy: Colostomy  Location: RUQ  Date of Surgery:9/25  Surgeon: Dr. Smith    Treatments:  Pouch removed, stoma and peristomal skin cleaned and assessed, new pouch prepared and applied.    Findings:  Current appliance: two piece  Stoma size:30mm  Stoma color: red  Characteristics: moist, budded  Peristomal skin: intact  Abdomen: soft, tender to touch  Output:50mL watery brown     Teaching/subjects covered today:  Encouraged patient to review handout given to patient/family and ask questions regarding material on next ostomy nurse visit.    9/26 General anatomy and expectations of output  9/26 Normal and abnormal stoma characteristics & changes over time.  9/26 Normal abnormal peristomal characteristics.  9/26 When/how to clean stoma & peristomal skin.  9/26 When/how to empty pouch          Crusting technique  9/26 When/how to change appliance  9/26  When to notify nurse/Physician  9/26 Dietary guidelines  9/26 Where/how to obtain supplies  9/26 Manipulated/practiced with clean pouch and pouch closure  9/26 reviewed ADL's(bathing,mattress cover,car pillow to protect from seatbelt,etc.)          Blockage S/S and electrolyte imbalance    Patient demonstrated understanding of above material covered.   Friend present during visit, will be assisting in pouch changes once discharged    Recommendations:  Empty appliance when 1/3 full and PRN. Encourage patient/family to notify nurse and assist w/ pouch emptying to promote self care. Change appliance twice weekly and PRN for leaking ASAP. DO NOT REINFORCE LEAKS to avoid skin breakdown.    Transition of Care:  Ostomy nurse to return and continue teaching.    Supplies given to patient with ordering information.     Laxmi Yip RN  [facility] Inpatient Wound [facility]  Available via [employer]

## 2024-09-26 NOTE — PERIOP NOTE
TRANSFER - OUT REPORT:    Verbal report given to Faye BHAKTA on Charito Singh  being transferred to [facility] for routine post-op       Report consisted of patient's Situation, Background, Assessment and   Recommendations(SBAR).     Information from the following report(s) Nurse Handoff Report, Surgery Report, Intake/Output, MAR, and Cardiac Rhythm NSR  was reviewed with the receiving nurse.           Lines:   Peripheral IV 09/25/24 Right Wrist (Active)   Site Assessment Clean, dry & intact 09/25/24 2119   Line Status Infusing 09/25/24 2025   Line Care Connections checked and tightened 09/25/24 2025   Phlebitis Assessment No symptoms 09/25/24 2025   Infiltration Assessment 0 09/25/24 2025   Alcohol Cap Used Yes 09/25/24 2025   Dressing Status Clean, dry & intact 09/25/24 2025   Dressing Type Transparent 09/25/24 2025       Peripheral IV 09/25/24 Left Hand (Active)   Site Assessment Clean, dry & intact 09/25/24 2119   Line Status Flushed;Infusing 09/25/24 2025   Line Care Connections checked and tightened 09/25/24 2025   Phlebitis Assessment No symptoms 09/25/24 2025   Infiltration Assessment 0 09/25/24 2025   Alcohol Cap Used Yes 09/25/24 2025   Dressing Status Clean, dry & intact 09/25/24 2025   Dressing Type Transparent 09/25/24 2025        Opportunity for questions and clarification was provided.      Patient transported with:  O2 @ 2lpm and Registered Nurse

## 2024-09-26 NOTE — CARE COORDINATION
9/26/24   2:39 PM    Care Management Progress Note    Reason for Admission:   Rectal bleeding [K62.5]  Colonic mass [K63.89]  Procedure(s) (LRB):  LAPAROSCOPIC DIVERTING COLOSTOMY (N/A)  1 Day Post-Op    Patient Admission Status: Inpatient  RUR:   Hospitalization in the last 30 days (Readmission):  No        Transition of care plan:  Colorectal Surgery- new ostomy- wound nurse is helping with teaching  Home with family  Discharge plan communicated with patient and/or discharge caregiver: Yes    Date 1st IMM letter given: N/A  Outpatient follow-up.  Transport at discharge: Family    Patient reviewed in IDR. Patient with a new ostomy, wound nurse is assisting with teaching. Patient will need supplies at [place] for ostomy. CM unable to secure home health for johanne visits, patient is uninsured. CM set up an appointment with the Wound Care clinic for 9/30/24 at 10:00 AM ([facility] location due to only clinic having an ostomy nurse) to meet with the Ostomy nurse who will continue to assist in teaching and ensure patient is comfortable with caring for her ostomy. Added information to the AVS.     BRANDI Garza  [facility]      Available via [employer]

## 2024-09-26 NOTE — OP NOTE
[facility]          [address] [place]  [address]                            OPERATIVE REPORT      PATIENT NAME: AUGUST SOSA      : 1975  MED REC NO: [id number]                       ROOM: 413  ACCOUNT NO: [id number]                       ADMIT DATE: 2024  PROVIDER: Srinivas Smith MD    DATE OF SERVICE:  2024    PREOPERATIVE DIAGNOSES:  Near obstructing rectal cancer, need for diversion prior to total neoadjuvant therapy.    POSTOPERATIVE DIAGNOSES:  Near obstructing rectal cancer, need for diversion prior to total neoadjuvant therapy.    PROCEDURES PERFORMED:  Laparoscopic diverting loop transverse colostomy.    SURGEON:  Srinivas Smith MD    ASSISTANT:  Maddie Langley.    ANESTHESIA:  GET.    ESTIMATED BLOOD LOSS:  20 mL.    SPECIMENS REMOVED:  None.    INTRAOPERATIVE FINDINGS:  ***     COMPLICATIONS:  ***    IMPLANTS:  None.    INDICATIONS:  This is a very pleasant 49-year-old who presented with a rectosigmoid mass.  As part of her workup, she underwent a pan CT scan showing a large mass extending from the distal sigmoid to the rectum and pelvic hollow.  Gastroenterology performed a colonoscopy, but they were unable to traverse this area.  Therefore, there was concern for complete obstruction.  An MRI was obtained.  This lesion extended below the peritoneal reflection to approximately 9 cm from the anal verge and 5 cm from the anorectal ring.  Therefore, this replaces solidly in the mid rectum.  Therefore, prior to initiating total neoadjuvant therapy, she is in need of proximal diversion to prevent complete obstruction and perforation during therapy.    DESCRIPTION OF PROCEDURE:  The patient was brought to the operating room and a standard pause observed by entire staff.  The patient's name and procedure were clearly identified by all.  Next, she was placed in the lithotomy position on the operating room table.  She was 
.                       [facility]  Formerly [facility]  Jonny Mckeon MD  [phone]      2024    Flexible sigmoidoscopy procedure Note  Charito Luciano Singh  :  1975  Antoinette Medical Record Number: [id number]    Indications:   CT with rectosigmoid malignant appearing mass with local lymphadenopathy, obstipation, rectal bleeding, MRI pelvis ordered, CT chest with contrast without evidence of malignancy    PCP:  None, None  Anesthesia/Sedation: Monitored anesthesia care, see separate note  Endoscopist:  Jonny Mckeon MD   Complications:  None  Estimated Blood Loss:  None    Permit:  The indications, risks, benefits and alternatives were reviewed with the patient or their decision maker who was provided an opportunity to ask questions and all questions were answered.  The specific risks of colonoscopy with conscious sedation were reviewed, including but not limited to anesthetic complication, bleeding, adverse drug reaction, missed lesion, infection, IV site reactions, and intestinal perforation which would lead to the need for surgical repair.  Alternatives to colonoscopy including radiographic imaging, observation without testing, or laboratory testing were reviewed including the limitations of those alternatives.  After considering the options and having all their questions answered, the patient or their decision maker provided both verbal and written consent to proceed.        Procedure in Detail:  After obtaining informed consent, positioning of the patient in the left lateral decubitus position, and conduction of a pre-procedure pause or \"time out\" the endoscope was introduced into the anus and advanced to the rectosigmoid junction.  The quality of the colonic preparation was fair.  A careful inspection was made as the colonoscope was withdrawn, findings and interventions are described below.  After cleaning the colon, the Reklaw Bowel Prep 
sheath.  A cruciate incision was made in the anterior rectus sheath.  I was able to then deliver the loop of transverse colon through this site.  There was sufficient mobility.    After delivering the loop of the transverse colon out through the aperture to the skin, pneumoperitoneum was reestablished.  I made sure that the loop of transverse colon that was exiting was not twisted.  There was no obvious twisting noted.  At this point, laparoscopic portion of this surgery was then terminated.    At this point, with all sponge count and instrument counts correct, all trocars were removed including the mini GelPort trocar.  As I had not converted to open, no separate back table was deemed necessary after conferring with all OR staff.  A 6-pack of Seprafilm was then placed through the mini GelPort trocar site.  The peritoneum at the mini GelPort trocar site was then closed using a running locking 3-0 Vicryl stitch.  Exparel was injected around the site.  Next, irrigation was performed.  The anterior rectus sheath was then reapproximated in a transverse fashion using a #1 Stratafix stitch.  Subcu tissue was irrigated saline.  I reapproximated the subcu tissue with interrupted 3-0 Vicryl.  The skin was then reapproximated using a 4-0 Monocryl subcuticular stitch.    Again, after verifying all sponge and instrument counts correct, the remainder of the trocars were removed.  Please note that this had been performed prior to closing the mini GelPort site as well, as far as the sponge count, instrument counts.  All 5 mm trocar sites were then irrigated.  All 5 mm trocar sites were then closed using a 4-0 Monocryl subcuticular stitch followed by Dermabond.    With all incisions closed and covered by Dermabond and a blue towel, prepared to mature the loop transverse colostomy.  Incision was made in the anterior wall.  Next, the loop of transverse colostomy was then matured in a Laxmi like fashion using interrupted 3-0 Vicryl

## 2024-09-27 ENCOUNTER — APPOINTMENT (OUTPATIENT)
Facility: HOSPITAL | Age: 49
DRG: 330 | End: 2024-09-27

## 2024-09-27 LAB
ALBUMIN SERPL-MCNC: 2.9 G/DL (ref 3.5–5)
ALBUMIN/GLOB SERPL: 0.8 (ref 1.1–2.2)
ALP SERPL-CCNC: 64 U/L (ref 45–117)
ALT SERPL-CCNC: 25 U/L (ref 12–78)
ANION GAP SERPL CALC-SCNC: 3 MMOL/L (ref 2–12)
APPEARANCE UR: CLEAR
AST SERPL-CCNC: 27 U/L (ref 15–37)
B PERT DNA SPEC QL NAA+PROBE: NOT DETECTED
BACTERIA URNS QL MICRO: NEGATIVE /HPF
BASOPHILS # BLD: 0 K/UL (ref 0–0.1)
BASOPHILS # BLD: 0 K/UL (ref 0–0.1)
BASOPHILS NFR BLD: 0 % (ref 0–1)
BASOPHILS NFR BLD: 0 % (ref 0–1)
BILIRUB SERPL-MCNC: 0.6 MG/DL (ref 0.2–1)
BILIRUB UR QL: NEGATIVE
BORDETELLA PARAPERTUSSIS BY PCR: NOT DETECTED
BUN SERPL-MCNC: 3 MG/DL (ref 6–20)
BUN/CREAT SERPL: 5 (ref 12–20)
C PNEUM DNA SPEC QL NAA+PROBE: NOT DETECTED
CALCIUM SERPL-MCNC: 8.4 MG/DL (ref 8.5–10.1)
CHLORIDE SERPL-SCNC: 105 MMOL/L (ref 97–108)
CO2 SERPL-SCNC: 28 MMOL/L (ref 21–32)
COLOR UR: ABNORMAL
CREAT SERPL-MCNC: 0.55 MG/DL (ref 0.55–1.02)
DIFFERENTIAL METHOD BLD: ABNORMAL
DIFFERENTIAL METHOD BLD: ABNORMAL
EOSINOPHIL # BLD: 0.2 K/UL (ref 0–0.4)
EOSINOPHIL # BLD: 0.3 K/UL (ref 0–0.4)
EOSINOPHIL NFR BLD: 1 % (ref 0–7)
EOSINOPHIL NFR BLD: 2 % (ref 0–7)
EPITH CASTS URNS QL MICRO: ABNORMAL /LPF
ERYTHROCYTE [DISTWIDTH] IN BLOOD BY AUTOMATED COUNT: 28.7 % (ref 11.5–14.5)
ERYTHROCYTE [DISTWIDTH] IN BLOOD BY AUTOMATED COUNT: 29.2 % (ref 11.5–14.5)
FLUAV SUBTYP SPEC NAA+PROBE: NOT DETECTED
FLUBV RNA SPEC QL NAA+PROBE: NOT DETECTED
GLOBULIN SER CALC-MCNC: 3.5 G/DL (ref 2–4)
GLUCOSE SERPL-MCNC: 139 MG/DL (ref 65–100)
GLUCOSE UR STRIP.AUTO-MCNC: NEGATIVE MG/DL
HADV DNA SPEC QL NAA+PROBE: NOT DETECTED
HCOV 229E RNA SPEC QL NAA+PROBE: NOT DETECTED
HCOV HKU1 RNA SPEC QL NAA+PROBE: NOT DETECTED
HCOV NL63 RNA SPEC QL NAA+PROBE: NOT DETECTED
HCOV OC43 RNA SPEC QL NAA+PROBE: NOT DETECTED
HCT VFR BLD AUTO: 29.5 % (ref 35–47)
HCT VFR BLD AUTO: 30.5 % (ref 35–47)
HGB BLD-MCNC: 8.8 G/DL (ref 11.5–16)
HGB BLD-MCNC: 8.9 G/DL (ref 11.5–16)
HGB UR QL STRIP: NEGATIVE
HMPV RNA SPEC QL NAA+PROBE: NOT DETECTED
HPIV1 RNA SPEC QL NAA+PROBE: NOT DETECTED
HPIV2 RNA SPEC QL NAA+PROBE: NOT DETECTED
HPIV3 RNA SPEC QL NAA+PROBE: NOT DETECTED
HPIV4 RNA SPEC QL NAA+PROBE: NOT DETECTED
HYALINE CASTS URNS QL MICRO: ABNORMAL /LPF (ref 0–2)
IMM GRANULOCYTES # BLD AUTO: 0.1 K/UL (ref 0–0.04)
IMM GRANULOCYTES # BLD AUTO: 0.2 K/UL (ref 0–0.04)
IMM GRANULOCYTES NFR BLD AUTO: 1 % (ref 0–0.5)
IMM GRANULOCYTES NFR BLD AUTO: 1 % (ref 0–0.5)
KETONES UR QL STRIP.AUTO: NEGATIVE MG/DL
LEUKOCYTE ESTERASE UR QL STRIP.AUTO: NEGATIVE
LYMPHOCYTES # BLD: 2.3 K/UL (ref 0.8–3.5)
LYMPHOCYTES # BLD: 2.4 K/UL (ref 0.8–3.5)
LYMPHOCYTES NFR BLD: 16 % (ref 12–49)
LYMPHOCYTES NFR BLD: 18 % (ref 12–49)
M PNEUMO DNA SPEC QL NAA+PROBE: NOT DETECTED
MCH RBC QN AUTO: 21.2 PG (ref 26–34)
MCH RBC QN AUTO: 21.2 PG (ref 26–34)
MCHC RBC AUTO-ENTMCNC: 29.2 G/DL (ref 30–36.5)
MCHC RBC AUTO-ENTMCNC: 29.8 G/DL (ref 30–36.5)
MCV RBC AUTO: 71.1 FL (ref 80–99)
MCV RBC AUTO: 72.6 FL (ref 80–99)
MONOCYTES # BLD: 1.2 K/UL (ref 0–1)
MONOCYTES # BLD: 1.7 K/UL (ref 0–1)
MONOCYTES NFR BLD: 13 % (ref 5–13)
MONOCYTES NFR BLD: 8 % (ref 5–13)
NEUTS SEG # BLD: 11.2 K/UL (ref 1.8–8)
NEUTS SEG # BLD: 8.4 K/UL (ref 1.8–8)
NEUTS SEG NFR BLD: 66 % (ref 32–75)
NEUTS SEG NFR BLD: 74 % (ref 32–75)
NITRITE UR QL STRIP.AUTO: NEGATIVE
NRBC # BLD: 0 K/UL (ref 0–0.01)
NRBC # BLD: 0 K/UL (ref 0–0.01)
NRBC BLD-RTO: 0 PER 100 WBC
NRBC BLD-RTO: 0 PER 100 WBC
PH UR STRIP: 7 (ref 5–8)
PLATELET # BLD AUTO: 380 K/UL (ref 150–400)
PLATELET # BLD AUTO: 396 K/UL (ref 150–400)
PMV BLD AUTO: 10 FL (ref 8.9–12.9)
POTASSIUM SERPL-SCNC: 3.6 MMOL/L (ref 3.5–5.1)
PROT SERPL-MCNC: 6.4 G/DL (ref 6.4–8.2)
PROT UR STRIP-MCNC: NEGATIVE MG/DL
RBC # BLD AUTO: 4.15 M/UL (ref 3.8–5.2)
RBC # BLD AUTO: 4.2 M/UL (ref 3.8–5.2)
RBC #/AREA URNS HPF: ABNORMAL /HPF (ref 0–5)
RBC MORPH BLD: ABNORMAL
RSV RNA SPEC QL NAA+PROBE: NOT DETECTED
RV+EV RNA SPEC QL NAA+PROBE: NOT DETECTED
SARS-COV-2 RNA RESP QL NAA+PROBE: NOT DETECTED
SODIUM SERPL-SCNC: 136 MMOL/L (ref 136–145)
SP GR UR REFRACTOMETRY: 1.01 (ref 1–1.03)
URINE CULTURE IF INDICATED: ABNORMAL
UROBILINOGEN UR QL STRIP.AUTO: 2 EU/DL (ref 0.2–1)
WBC # BLD AUTO: 12.8 K/UL (ref 3.6–11)
WBC # BLD AUTO: 15.2 K/UL (ref 3.6–11)
WBC URNS QL MICRO: ABNORMAL /HPF (ref 0–4)

## 2024-09-27 PROCEDURE — 80053 COMPREHEN METABOLIC PANEL: CPT

## 2024-09-27 PROCEDURE — 97161 PT EVAL LOW COMPLEX 20 MIN: CPT

## 2024-09-27 PROCEDURE — 85025 COMPLETE CBC W/AUTO DIFF WBC: CPT

## 2024-09-27 PROCEDURE — 6360000002 HC RX W HCPCS: Performed by: COLON & RECTAL SURGERY

## 2024-09-27 PROCEDURE — 6370000000 HC RX 637 (ALT 250 FOR IP): Performed by: COLON & RECTAL SURGERY

## 2024-09-27 PROCEDURE — 0202U NFCT DS 22 TRGT SARS-COV-2: CPT

## 2024-09-27 PROCEDURE — 2500000003 HC RX 250 WO HCPCS: Performed by: COLON & RECTAL SURGERY

## 2024-09-27 PROCEDURE — 2580000003 HC RX 258: Performed by: COLON & RECTAL SURGERY

## 2024-09-27 PROCEDURE — 6360000004 HC RX CONTRAST MEDICATION: Performed by: INTERNAL MEDICINE

## 2024-09-27 PROCEDURE — 36415 COLL VENOUS BLD VENIPUNCTURE: CPT

## 2024-09-27 PROCEDURE — 71260 CT THORAX DX C+: CPT

## 2024-09-27 PROCEDURE — 1100000000 HC RM PRIVATE

## 2024-09-27 PROCEDURE — 81001 URINALYSIS AUTO W/SCOPE: CPT

## 2024-09-27 PROCEDURE — 94761 N-INVAS EAR/PLS OXIMETRY MLT: CPT

## 2024-09-27 RX ORDER — IOPAMIDOL 755 MG/ML
100 INJECTION, SOLUTION INTRAVASCULAR
Status: COMPLETED | OUTPATIENT
Start: 2024-09-27 | End: 2024-09-27

## 2024-09-27 RX ORDER — ACETAMINOPHEN 500 MG
1000 TABLET ORAL EVERY 6 HOURS
Status: DISCONTINUED | OUTPATIENT
Start: 2024-09-27 | End: 2024-10-01 | Stop reason: HOSPADM

## 2024-09-27 RX ORDER — KETOROLAC TROMETHAMINE 15 MG/ML
15 INJECTION, SOLUTION INTRAMUSCULAR; INTRAVENOUS EVERY 8 HOURS
Status: DISCONTINUED | OUTPATIENT
Start: 2024-09-27 | End: 2024-10-01 | Stop reason: HOSPADM

## 2024-09-27 RX ORDER — DIATRIZOATE MEGLUMINE AND DIATRIZOATE SODIUM 660; 100 MG/ML; MG/ML
30 SOLUTION ORAL; RECTAL
Status: DISCONTINUED | OUTPATIENT
Start: 2024-09-27 | End: 2024-09-29

## 2024-09-27 RX ADMIN — KETOROLAC TROMETHAMINE 15 MG: 15 INJECTION, SOLUTION INTRAMUSCULAR; INTRAVENOUS at 06:50

## 2024-09-27 RX ADMIN — SODIUM CHLORIDE, PRESERVATIVE FREE 10 ML: 5 INJECTION INTRAVENOUS at 09:00

## 2024-09-27 RX ADMIN — DIATRIZOATE MEGLUMINE AND DIATRIZOATE SODIUM 30 ML: 660; 100 LIQUID ORAL; RECTAL at 15:24

## 2024-09-27 RX ADMIN — PANTOPRAZOLE SODIUM 40 MG: 40 TABLET, DELAYED RELEASE ORAL at 06:21

## 2024-09-27 RX ADMIN — HYDROMORPHONE HYDROCHLORIDE 0.5 MG: 1 INJECTION, SOLUTION INTRAMUSCULAR; INTRAVENOUS; SUBCUTANEOUS at 02:48

## 2024-09-27 RX ADMIN — OXYCODONE HYDROCHLORIDE 5 MG: 5 TABLET ORAL at 15:33

## 2024-09-27 RX ADMIN — PIPERACILLIN AND TAZOBACTAM 3375 MG: 3; .375 INJECTION, POWDER, LYOPHILIZED, FOR SOLUTION INTRAVENOUS at 06:22

## 2024-09-27 RX ADMIN — IOPAMIDOL 100 ML: 755 INJECTION, SOLUTION INTRAVENOUS at 16:52

## 2024-09-27 RX ADMIN — PIPERACILLIN AND TAZOBACTAM 3375 MG: 3; .375 INJECTION, POWDER, LYOPHILIZED, FOR SOLUTION INTRAVENOUS at 13:36

## 2024-09-27 RX ADMIN — POTASSIUM CHLORIDE, DEXTROSE MONOHYDRATE AND SODIUM CHLORIDE: 150; 5; 450 INJECTION, SOLUTION INTRAVENOUS at 20:09

## 2024-09-27 RX ADMIN — ACETAMINOPHEN 1000 MG: 500 TABLET ORAL at 20:02

## 2024-09-27 RX ADMIN — OXYCODONE HYDROCHLORIDE 5 MG: 5 TABLET ORAL at 20:02

## 2024-09-27 RX ADMIN — KETOROLAC TROMETHAMINE 15 MG: 15 INJECTION, SOLUTION INTRAMUSCULAR; INTRAVENOUS at 22:38

## 2024-09-27 RX ADMIN — ACETAMINOPHEN 1000 MG: 500 TABLET ORAL at 12:51

## 2024-09-27 RX ADMIN — ACETAMINOPHEN 1000 MG: 500 TABLET ORAL at 06:50

## 2024-09-27 RX ADMIN — KETOROLAC TROMETHAMINE 15 MG: 15 INJECTION, SOLUTION INTRAMUSCULAR; INTRAVENOUS at 15:34

## 2024-09-27 RX ADMIN — PIPERACILLIN AND TAZOBACTAM 3375 MG: 3; .375 INJECTION, POWDER, LYOPHILIZED, FOR SOLUTION INTRAVENOUS at 22:39

## 2024-09-27 RX ADMIN — OXYCODONE HYDROCHLORIDE 5 MG: 5 TABLET ORAL at 12:50

## 2024-09-27 ASSESSMENT — PAIN SCALES - GENERAL
PAINLEVEL_OUTOF10: 3
PAINLEVEL_OUTOF10: 7
PAINLEVEL_OUTOF10: 5
PAINLEVEL_OUTOF10: 5

## 2024-09-27 ASSESSMENT — PAIN DESCRIPTION - LOCATION
LOCATION: ABDOMEN

## 2024-09-27 ASSESSMENT — PAIN DESCRIPTION - DESCRIPTORS
DESCRIPTORS: ACHING
DESCRIPTORS: SORE
DESCRIPTORS: SORE

## 2024-09-27 ASSESSMENT — PAIN DESCRIPTION - ORIENTATION: ORIENTATION: ANTERIOR

## 2024-09-27 NOTE — WOUND CARE
Ostomy Progress Note:  Second postoperative visit.  Type of Ostomy: Colostomy  Location: [facility]  Date of Surgery:9/25  Surgeon: Dr. Smith    Treatments:  Patient declining education on pouch change due to pain at this time, family member that is assisting with care has left for today and will return in the morning.     Findings:  Current appliance: two piece  Stoma size:30mm  Stoma color: red  Characteristics: moist, budded  Peristomal skin: intact  Abdomen: soft, tender to touch  Output: watery brown/ positive flatus    Teaching/subjects covered today:  Encouraged patient to review handout given to patient/family and ask questions regarding material on next ostomy nurse visit.    9/26 General anatomy and expectations of output  9/26 Normal and abnormal stoma characteristics & changes over time.  9/26 Normal abnormal peristomal characteristics.  9/26 When/how to clean stoma & peristomal skin.  9/26 When/how to empty pouch          Crusting technique  9/26 When/how to change appliance  9/26  When to notify nurse/Physician  9/26 Dietary guidelines  9/26 Where/how to obtain supplies  9/26 Manipulated/practiced with clean pouch and pouch closure  9/26 reviewed ADL's(bathing,mattress cover,car pillow to protect from seatbelt,etc.)          Blockage S/S and electrolyte imbalance    Recommendations:  Empty appliance when 1/3 full and PRN. Encourage patient/family to notify nurse and assist w/ pouch emptying to promote self care. Change appliance twice weekly and PRN for leaking ASAP. DO NOT REINFORCE LEAKS to avoid skin breakdown.    Transition of Care:  Ostomy nurse to return Monday and continue teaching.    Supplies given to patient with ordering information.     Laxmi Yip RN  [facility]  Available via [employer]

## 2024-09-27 NOTE — CARE COORDINATION
9/27/2024  2:26 PM  Care Management Progress Note    Reason for Admission:   Rectal bleeding [K62.5]  Colonic mass [K63.89]  Procedure(s) (LRB):  LAPAROSCOPIC DIVERTING COLOSTOMY (N/A)  2 Days Post-Op    Patient Admission Status: Inpatient  RUR: Readmission Risk Score: 10.8    Hospitalization in the last 30 days (Readmission):  No        Transition of care plan:  Awaiting medical clearance and DC order. Wound care following. Colorectal following.  Home with family assistance and wound care through the wound care center. Wound Care providing teaching and supplies prior to DC.   Outpatient follow-up. Pt is scheduled with [facility] 9/30/24 at 10:00 AM.   Pt's family to transport.       09/18/24 1943   Service Assessment   Patient Orientation Alert and Oriented   Cognition Alert   History Provided By Patient   Primary Caregiver Self   Support Systems Friends/Neighbors   Patient's Healthcare Decision Maker is: Legal Next of Kin   PCP Verified by CM No  (pt has no PCP)   Prior Functional Level Independent in ADLs/IADLs   Current Functional Level Independent in ADLs/IADLs   Can patient return to prior living arrangement Yes   Ability to make needs known: Good   Family able to assist with home care needs: Yes   Financial Resources None   Community Resources None   Social/Functional History   Lives With Friend(s)   Type of Home Trailer   Home Equipment None   ADL Assistance Independent   Ambulation Assistance Independent   Transfer Assistance Independent   Active  No   Patient's  Info Friend or public transportation   Mode of Transportation Car;Bus   Discharge Planning   Type of Residence House   Living Arrangements Friends   Current Services Prior To Admission None   Potential Assistance Needed Other (Comment)  (Medications)   Potential Assistance Purchasing Medications Yes   Type of Home Care Services None   Patient expects to be discharged to: Trailer/mobile home   History of falls? 0

## 2024-09-28 LAB
ANION GAP SERPL CALC-SCNC: 5 MMOL/L (ref 2–12)
BASOPHILS # BLD: 0 K/UL (ref 0–0.1)
BASOPHILS NFR BLD: 0 % (ref 0–1)
BUN SERPL-MCNC: 3 MG/DL (ref 6–20)
BUN/CREAT SERPL: 8 (ref 12–20)
CALCIUM SERPL-MCNC: 8.9 MG/DL (ref 8.5–10.1)
CHLORIDE SERPL-SCNC: 109 MMOL/L (ref 97–108)
CO2 SERPL-SCNC: 28 MMOL/L (ref 21–32)
CREAT SERPL-MCNC: 0.39 MG/DL (ref 0.55–1.02)
DIFFERENTIAL METHOD BLD: ABNORMAL
EOSINOPHIL # BLD: 0.6 K/UL (ref 0–0.4)
EOSINOPHIL NFR BLD: 6 % (ref 0–7)
ERYTHROCYTE [DISTWIDTH] IN BLOOD BY AUTOMATED COUNT: 29.7 % (ref 11.5–14.5)
GLUCOSE SERPL-MCNC: 93 MG/DL (ref 65–100)
HCT VFR BLD AUTO: 29.9 % (ref 35–47)
HGB BLD-MCNC: 8.8 G/DL (ref 11.5–16)
IMM GRANULOCYTES # BLD AUTO: 0.1 K/UL (ref 0–0.04)
IMM GRANULOCYTES NFR BLD AUTO: 1 % (ref 0–0.5)
LYMPHOCYTES # BLD: 2.4 K/UL (ref 0.8–3.5)
LYMPHOCYTES NFR BLD: 26 % (ref 12–49)
MCH RBC QN AUTO: 21.4 PG (ref 26–34)
MCHC RBC AUTO-ENTMCNC: 29.4 G/DL (ref 30–36.5)
MCV RBC AUTO: 72.6 FL (ref 80–99)
MONOCYTES # BLD: 0.7 K/UL (ref 0–1)
MONOCYTES NFR BLD: 8 % (ref 5–13)
NEUTS SEG # BLD: 5.5 K/UL (ref 1.8–8)
NEUTS SEG NFR BLD: 59 % (ref 32–75)
NRBC # BLD: 0 K/UL (ref 0–0.01)
NRBC BLD-RTO: 0 PER 100 WBC
PLATELET # BLD AUTO: 384 K/UL (ref 150–400)
PLATELET COMMENT: ABNORMAL
POTASSIUM SERPL-SCNC: 3.6 MMOL/L (ref 3.5–5.1)
RBC # BLD AUTO: 4.12 M/UL (ref 3.8–5.2)
RBC MORPH BLD: ABNORMAL
SODIUM SERPL-SCNC: 142 MMOL/L (ref 136–145)
WBC # BLD AUTO: 9.3 K/UL (ref 3.6–11)

## 2024-09-28 PROCEDURE — 36415 COLL VENOUS BLD VENIPUNCTURE: CPT

## 2024-09-28 PROCEDURE — 2580000003 HC RX 258: Performed by: COLON & RECTAL SURGERY

## 2024-09-28 PROCEDURE — 6370000000 HC RX 637 (ALT 250 FOR IP): Performed by: COLON & RECTAL SURGERY

## 2024-09-28 PROCEDURE — 85025 COMPLETE CBC W/AUTO DIFF WBC: CPT

## 2024-09-28 PROCEDURE — 6360000002 HC RX W HCPCS: Performed by: COLON & RECTAL SURGERY

## 2024-09-28 PROCEDURE — 80048 BASIC METABOLIC PNL TOTAL CA: CPT

## 2024-09-28 PROCEDURE — 1100000000 HC RM PRIVATE

## 2024-09-28 RX ADMIN — SODIUM CHLORIDE, PRESERVATIVE FREE 10 ML: 5 INJECTION INTRAVENOUS at 09:00

## 2024-09-28 RX ADMIN — KETOROLAC TROMETHAMINE 15 MG: 15 INJECTION, SOLUTION INTRAMUSCULAR; INTRAVENOUS at 22:23

## 2024-09-28 RX ADMIN — OXYCODONE HYDROCHLORIDE 5 MG: 5 TABLET ORAL at 01:13

## 2024-09-28 RX ADMIN — ACETAMINOPHEN 1000 MG: 500 TABLET ORAL at 15:23

## 2024-09-28 RX ADMIN — PIPERACILLIN AND TAZOBACTAM 3375 MG: 3; .375 INJECTION, POWDER, LYOPHILIZED, FOR SOLUTION INTRAVENOUS at 22:23

## 2024-09-28 RX ADMIN — PANTOPRAZOLE SODIUM 40 MG: 40 TABLET, DELAYED RELEASE ORAL at 06:17

## 2024-09-28 RX ADMIN — KETOROLAC TROMETHAMINE 15 MG: 15 INJECTION, SOLUTION INTRAMUSCULAR; INTRAVENOUS at 06:15

## 2024-09-28 RX ADMIN — PIPERACILLIN AND TAZOBACTAM 3375 MG: 3; .375 INJECTION, POWDER, LYOPHILIZED, FOR SOLUTION INTRAVENOUS at 15:32

## 2024-09-28 RX ADMIN — OXYCODONE HYDROCHLORIDE 5 MG: 5 TABLET ORAL at 20:18

## 2024-09-28 RX ADMIN — PIPERACILLIN AND TAZOBACTAM 3375 MG: 3; .375 INJECTION, POWDER, LYOPHILIZED, FOR SOLUTION INTRAVENOUS at 06:25

## 2024-09-28 RX ADMIN — ACETAMINOPHEN 1000 MG: 500 TABLET ORAL at 01:12

## 2024-09-28 RX ADMIN — OXYCODONE HYDROCHLORIDE 5 MG: 5 TABLET ORAL at 15:22

## 2024-09-28 RX ADMIN — ACETAMINOPHEN 1000 MG: 500 TABLET ORAL at 20:18

## 2024-09-28 RX ADMIN — KETOROLAC TROMETHAMINE 15 MG: 15 INJECTION, SOLUTION INTRAMUSCULAR; INTRAVENOUS at 15:24

## 2024-09-28 ASSESSMENT — PAIN SCALES - GENERAL
PAINLEVEL_OUTOF10: 3
PAINLEVEL_OUTOF10: 5
PAINLEVEL_OUTOF10: 3
PAINLEVEL_OUTOF10: 3
PAINLEVEL_OUTOF10: 5
PAINLEVEL_OUTOF10: 2

## 2024-09-28 ASSESSMENT — PAIN DESCRIPTION - LOCATION
LOCATION: ABDOMEN

## 2024-09-28 ASSESSMENT — PAIN DESCRIPTION - DESCRIPTORS
DESCRIPTORS: SORE

## 2024-09-28 ASSESSMENT — PAIN DESCRIPTION - ORIENTATION: ORIENTATION: ANTERIOR

## 2024-09-29 LAB
ANION GAP SERPL CALC-SCNC: 3 MMOL/L (ref 2–12)
BASOPHILS # BLD: 0.1 K/UL (ref 0–0.1)
BASOPHILS NFR BLD: 1 % (ref 0–1)
BUN SERPL-MCNC: 4 MG/DL (ref 6–20)
BUN/CREAT SERPL: 11 (ref 12–20)
CALCIUM SERPL-MCNC: 8.8 MG/DL (ref 8.5–10.1)
CHLORIDE SERPL-SCNC: 110 MMOL/L (ref 97–108)
CO2 SERPL-SCNC: 29 MMOL/L (ref 21–32)
CREAT SERPL-MCNC: 0.35 MG/DL (ref 0.55–1.02)
DIFFERENTIAL METHOD BLD: ABNORMAL
EOSINOPHIL # BLD: 0.6 K/UL (ref 0–0.4)
EOSINOPHIL NFR BLD: 8 % (ref 0–7)
ERYTHROCYTE [DISTWIDTH] IN BLOOD BY AUTOMATED COUNT: 29.3 % (ref 11.5–14.5)
GLUCOSE SERPL-MCNC: 103 MG/DL (ref 65–100)
HCT VFR BLD AUTO: 28.9 % (ref 35–47)
HGB BLD-MCNC: 8.6 G/DL (ref 11.5–16)
IMM GRANULOCYTES # BLD AUTO: 0.1 K/UL (ref 0–0.04)
IMM GRANULOCYTES NFR BLD AUTO: 1 % (ref 0–0.5)
LYMPHOCYTES # BLD: 2.1 K/UL (ref 0.8–3.5)
LYMPHOCYTES NFR BLD: 31 % (ref 12–49)
MAGNESIUM SERPL-MCNC: 2.2 MG/DL (ref 1.6–2.4)
MCH RBC QN AUTO: 21.3 PG (ref 26–34)
MCHC RBC AUTO-ENTMCNC: 29.8 G/DL (ref 30–36.5)
MCV RBC AUTO: 71.7 FL (ref 80–99)
MONOCYTES # BLD: 0.6 K/UL (ref 0–1)
MONOCYTES NFR BLD: 8 % (ref 5–13)
NEUTS SEG # BLD: 3.4 K/UL (ref 1.8–8)
NEUTS SEG NFR BLD: 51 % (ref 32–75)
NRBC # BLD: 0 K/UL (ref 0–0.01)
NRBC BLD-RTO: 0 PER 100 WBC
PLATELET # BLD AUTO: 346 K/UL (ref 150–400)
PLATELET COMMENT: ABNORMAL
POTASSIUM SERPL-SCNC: 3.5 MMOL/L (ref 3.5–5.1)
RBC # BLD AUTO: 4.03 M/UL (ref 3.8–5.2)
RBC MORPH BLD: ABNORMAL
SODIUM SERPL-SCNC: 142 MMOL/L (ref 136–145)
WBC # BLD AUTO: 6.9 K/UL (ref 3.6–11)

## 2024-09-29 PROCEDURE — 1100000000 HC RM PRIVATE

## 2024-09-29 PROCEDURE — 6370000000 HC RX 637 (ALT 250 FOR IP): Performed by: COLON & RECTAL SURGERY

## 2024-09-29 PROCEDURE — 6360000002 HC RX W HCPCS: Performed by: COLON & RECTAL SURGERY

## 2024-09-29 PROCEDURE — 83735 ASSAY OF MAGNESIUM: CPT

## 2024-09-29 PROCEDURE — 80048 BASIC METABOLIC PNL TOTAL CA: CPT

## 2024-09-29 PROCEDURE — 2580000003 HC RX 258: Performed by: COLON & RECTAL SURGERY

## 2024-09-29 PROCEDURE — 85025 COMPLETE CBC W/AUTO DIFF WBC: CPT

## 2024-09-29 PROCEDURE — 2500000003 HC RX 250 WO HCPCS: Performed by: COLON & RECTAL SURGERY

## 2024-09-29 PROCEDURE — 94761 N-INVAS EAR/PLS OXIMETRY MLT: CPT

## 2024-09-29 PROCEDURE — 36415 COLL VENOUS BLD VENIPUNCTURE: CPT

## 2024-09-29 RX ADMIN — KETOROLAC TROMETHAMINE 15 MG: 15 INJECTION, SOLUTION INTRAMUSCULAR; INTRAVENOUS at 14:36

## 2024-09-29 RX ADMIN — OXYCODONE HYDROCHLORIDE 5 MG: 5 TABLET ORAL at 06:12

## 2024-09-29 RX ADMIN — PIPERACILLIN AND TAZOBACTAM 3375 MG: 3; .375 INJECTION, POWDER, LYOPHILIZED, FOR SOLUTION INTRAVENOUS at 06:15

## 2024-09-29 RX ADMIN — KETOROLAC TROMETHAMINE 15 MG: 15 INJECTION, SOLUTION INTRAMUSCULAR; INTRAVENOUS at 06:12

## 2024-09-29 RX ADMIN — PIPERACILLIN AND TAZOBACTAM 3375 MG: 3; .375 INJECTION, POWDER, LYOPHILIZED, FOR SOLUTION INTRAVENOUS at 22:31

## 2024-09-29 RX ADMIN — SODIUM CHLORIDE, PRESERVATIVE FREE 10 ML: 5 INJECTION INTRAVENOUS at 09:00

## 2024-09-29 RX ADMIN — PIPERACILLIN AND TAZOBACTAM 3375 MG: 3; .375 INJECTION, POWDER, LYOPHILIZED, FOR SOLUTION INTRAVENOUS at 14:37

## 2024-09-29 RX ADMIN — POTASSIUM CHLORIDE, DEXTROSE MONOHYDRATE AND SODIUM CHLORIDE: 150; 5; 450 INJECTION, SOLUTION INTRAVENOUS at 04:46

## 2024-09-29 RX ADMIN — ACETAMINOPHEN 1000 MG: 500 TABLET ORAL at 14:35

## 2024-09-29 RX ADMIN — KETOROLAC TROMETHAMINE 15 MG: 15 INJECTION, SOLUTION INTRAMUSCULAR; INTRAVENOUS at 22:32

## 2024-09-29 RX ADMIN — SODIUM CHLORIDE, PRESERVATIVE FREE 10 ML: 5 INJECTION INTRAVENOUS at 22:34

## 2024-09-29 RX ADMIN — ACETAMINOPHEN 1000 MG: 500 TABLET ORAL at 01:03

## 2024-09-29 RX ADMIN — ACETAMINOPHEN 1000 MG: 500 TABLET ORAL at 18:52

## 2024-09-29 RX ADMIN — PANTOPRAZOLE SODIUM 40 MG: 40 TABLET, DELAYED RELEASE ORAL at 06:12

## 2024-09-29 RX ADMIN — OXYCODONE HYDROCHLORIDE 5 MG: 5 TABLET ORAL at 01:04

## 2024-09-29 ASSESSMENT — PAIN DESCRIPTION - DESCRIPTORS
DESCRIPTORS: SORE
DESCRIPTORS: SORE

## 2024-09-29 ASSESSMENT — PAIN DESCRIPTION - LOCATION
LOCATION: ABDOMEN
LOCATION: ABDOMEN

## 2024-09-29 ASSESSMENT — PAIN SCALES - GENERAL
PAINLEVEL_OUTOF10: 5
PAINLEVEL_OUTOF10: 3

## 2024-09-30 LAB
ANION GAP SERPL CALC-SCNC: 4 MMOL/L (ref 2–12)
BASOPHILS # BLD: 0.1 K/UL (ref 0–0.1)
BASOPHILS NFR BLD: 1 % (ref 0–1)
BUN SERPL-MCNC: 3 MG/DL (ref 6–20)
BUN/CREAT SERPL: 7 (ref 12–20)
CALCIUM SERPL-MCNC: 9 MG/DL (ref 8.5–10.1)
CHLORIDE SERPL-SCNC: 113 MMOL/L (ref 97–108)
CO2 SERPL-SCNC: 27 MMOL/L (ref 21–32)
CREAT SERPL-MCNC: 0.43 MG/DL (ref 0.55–1.02)
DIFFERENTIAL METHOD BLD: ABNORMAL
EOSINOPHIL # BLD: 0.5 K/UL (ref 0–0.4)
EOSINOPHIL NFR BLD: 7 % (ref 0–7)
ERYTHROCYTE [DISTWIDTH] IN BLOOD BY AUTOMATED COUNT: 29.4 % (ref 11.5–14.5)
GLUCOSE SERPL-MCNC: 138 MG/DL (ref 65–100)
HCT VFR BLD AUTO: 28.7 % (ref 35–47)
HGB BLD-MCNC: 8.5 G/DL (ref 11.5–16)
IMM GRANULOCYTES # BLD AUTO: 0.1 K/UL (ref 0–0.04)
IMM GRANULOCYTES NFR BLD AUTO: 1 % (ref 0–0.5)
LYMPHOCYTES # BLD: 2.3 K/UL (ref 0.8–3.5)
LYMPHOCYTES NFR BLD: 32 % (ref 12–49)
MAGNESIUM SERPL-MCNC: 2 MG/DL (ref 1.6–2.4)
MCH RBC QN AUTO: 20.9 PG (ref 26–34)
MCHC RBC AUTO-ENTMCNC: 29.6 G/DL (ref 30–36.5)
MCV RBC AUTO: 70.7 FL (ref 80–99)
MONOCYTES # BLD: 0.5 K/UL (ref 0–1)
MONOCYTES NFR BLD: 7 % (ref 5–13)
NEUTS SEG # BLD: 3.7 K/UL (ref 1.8–8)
NEUTS SEG NFR BLD: 52 % (ref 32–75)
NRBC # BLD: 0 K/UL (ref 0–0.01)
NRBC BLD-RTO: 0 PER 100 WBC
PLATELET # BLD AUTO: 408 K/UL (ref 150–400)
PLATELET COMMENT: ABNORMAL
PMV BLD AUTO: 10.4 FL (ref 8.9–12.9)
POTASSIUM SERPL-SCNC: 3.3 MMOL/L (ref 3.5–5.1)
RBC # BLD AUTO: 4.06 M/UL (ref 3.8–5.2)
RBC MORPH BLD: ABNORMAL
SODIUM SERPL-SCNC: 144 MMOL/L (ref 136–145)
WBC # BLD AUTO: 7.2 K/UL (ref 3.6–11)

## 2024-09-30 PROCEDURE — 6360000002 HC RX W HCPCS: Performed by: COLON & RECTAL SURGERY

## 2024-09-30 PROCEDURE — 80048 BASIC METABOLIC PNL TOTAL CA: CPT

## 2024-09-30 PROCEDURE — 99232 SBSQ HOSP IP/OBS MODERATE 35: CPT | Performed by: INTERNAL MEDICINE

## 2024-09-30 PROCEDURE — 6370000000 HC RX 637 (ALT 250 FOR IP): Performed by: INTERNAL MEDICINE

## 2024-09-30 PROCEDURE — 6370000000 HC RX 637 (ALT 250 FOR IP): Performed by: COLON & RECTAL SURGERY

## 2024-09-30 PROCEDURE — 83735 ASSAY OF MAGNESIUM: CPT

## 2024-09-30 PROCEDURE — 85025 COMPLETE CBC W/AUTO DIFF WBC: CPT

## 2024-09-30 PROCEDURE — 2580000003 HC RX 258: Performed by: COLON & RECTAL SURGERY

## 2024-09-30 PROCEDURE — 94761 N-INVAS EAR/PLS OXIMETRY MLT: CPT

## 2024-09-30 PROCEDURE — 36415 COLL VENOUS BLD VENIPUNCTURE: CPT

## 2024-09-30 PROCEDURE — 1100000000 HC RM PRIVATE

## 2024-09-30 RX ORDER — POTASSIUM CHLORIDE 750 MG/1
40 TABLET, EXTENDED RELEASE ORAL ONCE
Status: COMPLETED | OUTPATIENT
Start: 2024-09-30 | End: 2024-09-30

## 2024-09-30 RX ADMIN — ACETAMINOPHEN 1000 MG: 500 TABLET ORAL at 01:52

## 2024-09-30 RX ADMIN — PIPERACILLIN AND TAZOBACTAM 3375 MG: 3; .375 INJECTION, POWDER, LYOPHILIZED, FOR SOLUTION INTRAVENOUS at 06:42

## 2024-09-30 RX ADMIN — KETOROLAC TROMETHAMINE 15 MG: 15 INJECTION, SOLUTION INTRAMUSCULAR; INTRAVENOUS at 14:28

## 2024-09-30 RX ADMIN — ACETAMINOPHEN 1000 MG: 500 TABLET ORAL at 19:08

## 2024-09-30 RX ADMIN — KETOROLAC TROMETHAMINE 15 MG: 15 INJECTION, SOLUTION INTRAMUSCULAR; INTRAVENOUS at 06:34

## 2024-09-30 RX ADMIN — PANTOPRAZOLE SODIUM 40 MG: 40 TABLET, DELAYED RELEASE ORAL at 06:33

## 2024-09-30 RX ADMIN — SODIUM CHLORIDE, PRESERVATIVE FREE 10 ML: 5 INJECTION INTRAVENOUS at 08:10

## 2024-09-30 RX ADMIN — KETOROLAC TROMETHAMINE 15 MG: 15 INJECTION, SOLUTION INTRAMUSCULAR; INTRAVENOUS at 22:08

## 2024-09-30 RX ADMIN — SODIUM CHLORIDE, PRESERVATIVE FREE 10 ML: 5 INJECTION INTRAVENOUS at 22:10

## 2024-09-30 RX ADMIN — ACETAMINOPHEN 1000 MG: 500 TABLET ORAL at 13:52

## 2024-09-30 RX ADMIN — ACETAMINOPHEN 1000 MG: 500 TABLET ORAL at 06:33

## 2024-09-30 RX ADMIN — POTASSIUM CHLORIDE 40 MEQ: 750 TABLET, FILM COATED, EXTENDED RELEASE ORAL at 12:40

## 2024-09-30 NOTE — CARE COORDINATION
9/30/2024  1:38 PM  Care Management Progress Note    Reason for Admission:   Rectal bleeding [K62.5]  Colonic mass [K63.89]  Procedure(s) (LRB):  LAPAROSCOPIC DIVERTING COLOSTOMY (N/A)  5 Days Post-Op    Patient Admission Status: Inpatient  RUR: 7 % Low risk of Readmission/Green  Hospitalization in the last 30 days (Readmission):  No        Transition of care plan:  Pt discussed in IDR is continuing to require medical management, awaiting clearance by colo-rectal   DC when stable to home w/ family assistance, pt lives w/ her sister-in-law who is at bedside today and agreeable to transport pt home and to all follow up  Discharge plan communicated with patient and/or discharge caregiver: Yes  using AMN  # [id number]    Outpatient follow-up, pt will follow up at [facility] for ostomy care,  CM scheduled appointment for 10/3 @ 8:00 AM, next available, AVS updated    Transport at discharge: Sister in Law  DINO Herrera

## 2024-09-30 NOTE — WOUND CARE
Ostomy Progress Note:  Third postoperative visit.  Type of Ostomy: Colostomy  Location: [facility]  Date of Surgery:9/25  Surgeon: Dr. Smith    Treatments:  Pouch removed, stoma and peristomal skin cleansed and assessed, new pouch prepared and applied by family member in room with coaching from wound/ostomy RN.      Findings:  Current appliance: one piece  Stoma size:30mm  Stoma color: red  Characteristics: moist, budded  Peristomal skin: intact  Abdomen: soft  Output: soft brown/ positive flatus    Teaching/subjects covered today:  Encouraged patient to review handout given to patient/family and ask questions regarding material on next ostomy nurse visit.    9/26, 9/30 General anatomy and expectations of output  9/26, 9/30  Normal and abnormal stoma characteristics & changes over time.  9/26, 9/30  Normal abnormal peristomal characteristics.  9/26, 9/30  When/how to clean stoma & peristomal skin.  9/26, 9/30  When/how to empty pouch           9/30  Crusting technique  9/26, 9/30  When/how to change appliance  9/26, 9/30  When to notify nurse/Physician  9/26, 9/30  Dietary guidelines  9/26, 9/30  Where/how to obtain supplies  9/26, 9/30  Manipulated/practiced with clean pouch and pouch closure  9/26, 9/30  reviewed ADL's(bathing,mattress cover,car pillow to protect from seatbelt,etc.)           9/30  Blockage S/S and electrolyte imbalance    Recommendations:  Empty appliance when 1/3 full and PRN. Encourage patient/family to notify nurse and assist w/ pouch emptying to promote self care. Change appliance twice weekly and PRN for leaking ASAP. DO NOT REINFORCE LEAKS to avoid skin breakdown.    Transition of Care:  Plan for discharge tomorrow.    Supplies given to patient, education provided in Maldivian with ordering information.     Laxmi Yip, RN  [facility]  Available via [employer]wolf

## 2024-10-01 ENCOUNTER — APPOINTMENT (OUTPATIENT)
Facility: HOSPITAL | Age: 49
DRG: 330 | End: 2024-10-01

## 2024-10-01 VITALS
SYSTOLIC BLOOD PRESSURE: 111 MMHG | HEIGHT: 64 IN | WEIGHT: 152.12 LBS | TEMPERATURE: 98.2 F | DIASTOLIC BLOOD PRESSURE: 69 MMHG | HEART RATE: 72 BPM | RESPIRATION RATE: 14 BRPM | BODY MASS INDEX: 25.97 KG/M2 | OXYGEN SATURATION: 97 %

## 2024-10-01 PROBLEM — C20 RECTAL CANCER (HCC): Status: ACTIVE | Noted: 2024-09-30

## 2024-10-01 LAB
BASOPHILS # BLD: 0.1 K/UL (ref 0–0.1)
BASOPHILS NFR BLD: 1 % (ref 0–1)
DIFFERENTIAL METHOD BLD: ABNORMAL
ECHO BSA: 1.71 M2
EOSINOPHIL # BLD: 0.4 K/UL (ref 0–0.4)
EOSINOPHIL NFR BLD: 5 % (ref 0–7)
ERYTHROCYTE [DISTWIDTH] IN BLOOD BY AUTOMATED COUNT: 29.8 % (ref 11.5–14.5)
HCT VFR BLD AUTO: 27.5 % (ref 35–47)
HGB BLD-MCNC: 8.3 G/DL (ref 11.5–16)
IMM GRANULOCYTES # BLD AUTO: 0.1 K/UL (ref 0–0.04)
IMM GRANULOCYTES NFR BLD AUTO: 1 % (ref 0–0.5)
LYMPHOCYTES # BLD: 2.6 K/UL (ref 0.8–3.5)
LYMPHOCYTES NFR BLD: 31 % (ref 12–49)
MCH RBC QN AUTO: 21.3 PG (ref 26–34)
MCHC RBC AUTO-ENTMCNC: 30.2 G/DL (ref 30–36.5)
MCV RBC AUTO: 70.7 FL (ref 80–99)
MONOCYTES # BLD: 0.6 K/UL (ref 0–1)
MONOCYTES NFR BLD: 7 % (ref 5–13)
NEUTS SEG # BLD: 4.7 K/UL (ref 1.8–8)
NEUTS SEG NFR BLD: 55 % (ref 32–75)
NRBC # BLD: 0 K/UL (ref 0–0.01)
NRBC BLD-RTO: 0 PER 100 WBC
PLATELET # BLD AUTO: 397 K/UL (ref 150–400)
PLATELET COMMENT: ABNORMAL
PMV BLD AUTO: 10.1 FL (ref 8.9–12.9)
RBC # BLD AUTO: 3.89 M/UL (ref 3.8–5.2)
RBC MORPH BLD: ABNORMAL
WBC # BLD AUTO: 8.5 K/UL (ref 3.6–11)

## 2024-10-01 PROCEDURE — 02H633Z INSERTION OF INFUSION DEVICE INTO RIGHT ATRIUM, PERCUTANEOUS APPROACH: ICD-10-PCS | Performed by: RADIOLOGY

## 2024-10-01 PROCEDURE — 2580000003 HC RX 258: Performed by: COLON & RECTAL SURGERY

## 2024-10-01 PROCEDURE — 81232 DPYD GENE COMMON VARIANTS: CPT

## 2024-10-01 PROCEDURE — 0JH63XZ INSERTION OF TUNNELED VASCULAR ACCESS DEVICE INTO CHEST SUBCUTANEOUS TISSUE AND FASCIA, PERCUTANEOUS APPROACH: ICD-10-PCS | Performed by: RADIOLOGY

## 2024-10-01 PROCEDURE — 6360000002 HC RX W HCPCS: Performed by: PHYSICIAN ASSISTANT

## 2024-10-01 PROCEDURE — 2709999900 HC NON-CHARGEABLE SUPPLY

## 2024-10-01 PROCEDURE — 36561 INSERT TUNNELED CV CATH: CPT

## 2024-10-01 PROCEDURE — 99152 MOD SED SAME PHYS/QHP 5/>YRS: CPT

## 2024-10-01 PROCEDURE — 76942 ECHO GUIDE FOR BIOPSY: CPT

## 2024-10-01 PROCEDURE — 36415 COLL VENOUS BLD VENIPUNCTURE: CPT

## 2024-10-01 PROCEDURE — 2500000003 HC RX 250 WO HCPCS: Performed by: PHYSICIAN ASSISTANT

## 2024-10-01 PROCEDURE — 85025 COMPLETE CBC W/AUTO DIFF WBC: CPT

## 2024-10-01 PROCEDURE — C1894 INTRO/SHEATH, NON-LASER: HCPCS

## 2024-10-01 PROCEDURE — 94761 N-INVAS EAR/PLS OXIMETRY MLT: CPT

## 2024-10-01 RX ORDER — LIDOCAINE HYDROCHLORIDE AND EPINEPHRINE 10; 10 MG/ML; UG/ML
INJECTION, SOLUTION INFILTRATION; PERINEURAL PRN
Status: COMPLETED | OUTPATIENT
Start: 2024-10-01 | End: 2024-10-01

## 2024-10-01 RX ORDER — OXYCODONE HYDROCHLORIDE 5 MG/1
5 TABLET ORAL EVERY 6 HOURS PRN
Qty: 20 TABLET | Refills: 0 | Status: SHIPPED | OUTPATIENT
Start: 2024-10-01 | End: 2024-10-06

## 2024-10-01 RX ORDER — PANTOPRAZOLE SODIUM 40 MG/1
40 TABLET, DELAYED RELEASE ORAL
Qty: 30 TABLET | Refills: 0 | Status: SHIPPED | OUTPATIENT
Start: 2024-10-02

## 2024-10-01 RX ORDER — FENTANYL CITRATE 50 UG/ML
INJECTION, SOLUTION INTRAMUSCULAR; INTRAVENOUS PRN
Status: COMPLETED | OUTPATIENT
Start: 2024-10-01 | End: 2024-10-01

## 2024-10-01 RX ORDER — LIDOCAINE HYDROCHLORIDE AND EPINEPHRINE BITARTRATE 20; .01 MG/ML; MG/ML
INJECTION, SOLUTION SUBCUTANEOUS PRN
Status: COMPLETED | OUTPATIENT
Start: 2024-10-01 | End: 2024-10-01

## 2024-10-01 RX ORDER — LIDOCAINE HYDROCHLORIDE 10 MG/ML
INJECTION, SOLUTION EPIDURAL; INFILTRATION; INTRACAUDAL; PERINEURAL PRN
Status: COMPLETED | OUTPATIENT
Start: 2024-10-01 | End: 2024-10-01

## 2024-10-01 RX ORDER — IBUPROFEN 200 MG
400 TABLET ORAL EVERY 6 HOURS PRN
Qty: 30 TABLET | Refills: 0 | Status: SHIPPED | OUTPATIENT
Start: 2024-10-01

## 2024-10-01 RX ORDER — HEPARIN 100 UNIT/ML
SYRINGE INTRAVENOUS PRN
Status: COMPLETED | OUTPATIENT
Start: 2024-10-01 | End: 2024-10-01

## 2024-10-01 RX ORDER — MIDAZOLAM HYDROCHLORIDE 2 MG/2ML
INJECTION, SOLUTION INTRAMUSCULAR; INTRAVENOUS PRN
Status: COMPLETED | OUTPATIENT
Start: 2024-10-01 | End: 2024-10-01

## 2024-10-01 RX ORDER — CEFAZOLIN SODIUM 1 G/3ML
INJECTION, POWDER, FOR SOLUTION INTRAMUSCULAR; INTRAVENOUS PRN
Status: COMPLETED | OUTPATIENT
Start: 2024-10-01 | End: 2024-10-01

## 2024-10-01 RX ORDER — ONDANSETRON 4 MG/1
4 TABLET, ORALLY DISINTEGRATING ORAL EVERY 8 HOURS PRN
Qty: 20 TABLET | Refills: 0 | Status: SHIPPED | OUTPATIENT
Start: 2024-10-01

## 2024-10-01 RX ADMIN — MIDAZOLAM HYDROCHLORIDE 0.5 MG: 1 INJECTION, SOLUTION INTRAMUSCULAR; INTRAVENOUS at 09:24

## 2024-10-01 RX ADMIN — LIDOCAINE HYDROCHLORIDE 5 ML: 10 INJECTION, SOLUTION EPIDURAL; INFILTRATION; INTRACAUDAL; PERINEURAL at 09:19

## 2024-10-01 RX ADMIN — FENTANYL CITRATE 25 MCG: 50 INJECTION, SOLUTION INTRAMUSCULAR; INTRAVENOUS at 09:28

## 2024-10-01 RX ADMIN — LIDOCAINE HYDROCHLORIDE 5 ML: 10 INJECTION, SOLUTION EPIDURAL; INFILTRATION; INTRACAUDAL; PERINEURAL at 09:14

## 2024-10-01 RX ADMIN — MIDAZOLAM HYDROCHLORIDE 0.5 MG: 1 INJECTION, SOLUTION INTRAMUSCULAR; INTRAVENOUS at 09:13

## 2024-10-01 RX ADMIN — FENTANYL CITRATE 25 MCG: 50 INJECTION, SOLUTION INTRAMUSCULAR; INTRAVENOUS at 08:58

## 2024-10-01 RX ADMIN — CEFAZOLIN SODIUM 2000 MG: 1 POWDER, FOR SOLUTION INTRAMUSCULAR; INTRAVENOUS at 08:57

## 2024-10-01 RX ADMIN — HEPARIN 500 UNITS: 100 SYRINGE at 09:29

## 2024-10-01 RX ADMIN — FENTANYL CITRATE 25 MCG: 50 INJECTION, SOLUTION INTRAMUSCULAR; INTRAVENOUS at 09:24

## 2024-10-01 RX ADMIN — FENTANYL CITRATE 25 MCG: 50 INJECTION, SOLUTION INTRAMUSCULAR; INTRAVENOUS at 09:13

## 2024-10-01 RX ADMIN — LIDOCAINE HYDROCHLORIDE,EPINEPHRINE BITARTRATE 7 ML: 10; .01 INJECTION, SOLUTION INFILTRATION; PERINEURAL at 09:23

## 2024-10-01 RX ADMIN — SODIUM CHLORIDE, PRESERVATIVE FREE 10 ML: 5 INJECTION INTRAVENOUS at 07:38

## 2024-10-01 NOTE — PROGRESS NOTES
Romina Lake PA-C                       [phone] office             Monday-Friday 8:00 am-4:30 pm  I am not permitted to use \"perfect serve\" use above for contact, thanks.        Gastroenterology Progress Note    September 19, 2024  Admit Date: 9/18/2024         Narrative Assessment and Plan   49 y.o. female being followed for rectal bleeding, colon mass.  She has drink half of her colonoscopy prep this morning, without a significant bowel movement yet this afternoon.  Reports severe lower abdominal pain.  Abdomen soft, midline lower tenderness.  Digital rectal exam with hypertonic sphincter and exquisite tenderness.    Impression:  Rectal mass  Constipation  Concern for partial colon obstruction    Plan:  Given distal location of sigmoid colon/rectal mass, as well as poor tolerance of bowel prep, we will opt to discontinue GoLytely prep in favor of enema this evening and again tomorrow morning.  Colonoscopy assessment will be attempted tomorrow, converted to flexible sigmoidoscopy if lack of complete bowel prep limits the procedure.  Please keep the patient n.p.o. past midnight.  Plan reviewed with the patient as well as her sister-in-law, all questions answered.  Patient expresses understanding and agreement.  Plan discussed with Dr. Mckeon.  Romina Lake PA-C    Subjective:   Chief Complaint: Abdominal pain during bowel prep    HPI: Remote  Angela ID #[id number] provided Romansh translation services for this encounter.  Patient began bowel prep with GoLytely this morning, drink 2 L of the prep.  She has only had a few small bowel movements, at the time of my visit there was a very small quantity of brown liquid stool in the toilet which she shows me.  She has began having severe midline lower abdominal pain with the prep, is sitting on the toilet bent over at the time of my visit.  Denies nausea or 
                                                             LILLY MCCORMICK [facility]  [address] [place], [place] [address] [phone]      Hospitalist  Progress Note      NAME:       Charito Singh   :        1975  MRM:        [id number]    Date of service: 2024      Subjective: Patient seen and examined by me. Patient admitted with colon mass. Discussed with her through a video Japanese interpretor. She feels better but a little bloated. No nausea or vomiting. No bleeding. Afebrile.      Objective:    Vital Signs:    /60   Pulse 82   Temp 99 °F (37.2 °C) (Oral)   Resp 16   Ht 1.626 m (5' 4\")   Wt 69 kg (152 lb 1.9 oz)   LMP 2024 (Approximate)   SpO2 95%   BMI 26.11 kg/m²        Intake/Output Summary (Last 24 hours) at 2024 0856  Last data filed at 2024 1544  Gross per 24 hour   Intake 1170 ml   Output --   Net 1170 ml        Current inpatient medications reviewed:  Current Facility-Administered Medications   Medication Dose Route Frequency    sodium chloride flush 0.9 % injection 5-40 mL  5-40 mL IntraVENous 2 times per day    sodium chloride flush 0.9 % injection 5-40 mL  5-40 mL IntraVENous PRN    0.9 % sodium chloride infusion   IntraVENous PRN    ondansetron (ZOFRAN-ODT) disintegrating tablet 4 mg  4 mg Oral Q8H PRN    Or    ondansetron (ZOFRAN) injection 4 mg  4 mg IntraVENous Q6H PRN    piperacillin-tazobactam (ZOSYN) 3,375 mg in sodium chloride 0.9 % 50 mL IVPB (mini-bag)  3,375 mg IntraVENous Q8H    0.9 % sodium chloride infusion   IntraVENous Continuous    pantoprazole (PROTONIX) tablet 40 mg  40 mg Oral BID AC    sodium chloride flush 0.9 % injection 5-40 mL  5-40 mL IntraVENous 2 times per day    sodium chloride flush 0.9 % injection 5-40 mL  5-40 mL IntraVENous PRN    0.9 % sodium chloride infusion   IntraVENous PRN    magnesium sulfate 2000 mg in 50 mL IVPB premix  2,000 mg IntraVENous PRN    ondansetron (ZOFRAN-ODT) 
                                                             LILLY MCCORMICK [facility]  [address] [place] [address]  [phone]      Hospitalist  Progress Note      NAME:       Charito Singh   :        1975  MRM:        [id number]    Date of service: 2024      Subjective: Patient seen and examined by me. Patient admitted with colon mass. Discussed with her through a video Colombian interpretor. She says she feels much better with less abdominal discomfort, having bowel movements. No fever or chills.     Objective:    Vital Signs:    /67   Pulse 79   Temp 98.3 °F (36.8 °C) (Oral)   Resp 18   Ht 1.626 m (5' 4\")   Wt 69 kg (152 lb 1.9 oz)   LMP 2024 (Approximate)   SpO2 100%   BMI 26.11 kg/m²      No intake or output data in the 24 hours ending 24 1229       Current inpatient medications reviewed:  Current Facility-Administered Medications   Medication Dose Route Frequency    sodium chloride flush 0.9 % injection 5-40 mL  5-40 mL IntraVENous PRN    0.9 % sodium chloride infusion   IntraVENous PRN    ondansetron (ZOFRAN-ODT) disintegrating tablet 4 mg  4 mg Oral Q8H PRN    Or    ondansetron (ZOFRAN) injection 4 mg  4 mg IntraVENous Q6H PRN    piperacillin-tazobactam (ZOSYN) 3,375 mg in sodium chloride 0.9 % 50 mL IVPB (mini-bag)  3,375 mg IntraVENous Q8H    0.9 % sodium chloride infusion   IntraVENous Continuous    pantoprazole (PROTONIX) tablet 40 mg  40 mg Oral BID AC    sodium chloride flush 0.9 % injection 5-40 mL  5-40 mL IntraVENous 2 times per day    sodium chloride flush 0.9 % injection 5-40 mL  5-40 mL IntraVENous PRN    0.9 % sodium chloride infusion   IntraVENous PRN    magnesium sulfate 2000 mg in 50 mL IVPB premix  2,000 mg IntraVENous PRN    ondansetron (ZOFRAN-ODT) disintegrating tablet 4 mg  4 mg Oral Q8H PRN    Or    ondansetron (ZOFRAN) injection 4 mg  4 mg IntraVENous Q6H PRN    polyethylene glycol (GLYCOLAX) packet 17 g  
                                                             LILLY MCCORMICK [facility]  [address] [place], [place] [address] [phone]      Hospitalist  Progress Note      NAME:       Charito Singh   :        1975  MRM:        [id number]    Date of service: 2024      Subjective: Patient seen and examined by me. Sp laparoscopic diverting colostomy. States pain well controlled   Objective:    Vital Signs:    /67   Pulse 70   Temp 98.4 °F (36.9 °C) (Oral)   Resp 16   Ht 1.626 m (5' 4.02\")   Wt 69 kg (152 lb 1.9 oz)   LMP 2024 (Approximate)   SpO2 97%   BMI 26.10 kg/m²        Intake/Output Summary (Last 24 hours) at 2024 1131  Last data filed at 2024  Gross per 24 hour   Intake 1700 ml   Output 200 ml   Net 1500 ml          Current inpatient medications reviewed:  Current Facility-Administered Medications   Medication Dose Route Frequency    dextrose 5 % and 0.45 % NaCl with KCl 20 mEq infusion   IntraVENous Continuous    oxyCODONE (ROXICODONE) immediate release tablet 5 mg  5 mg Oral Q4H PRN    oxyCODONE (ROXICODONE) immediate release tablet 10 mg  10 mg Oral Q4H PRN    HYDROmorphone (DILAUDID) injection 0.5 mg  0.5 mg IntraVENous Q4H PRN    pantoprazole (PROTONIX) tablet 40 mg  40 mg Oral QAM AC    sodium chloride flush 0.9 % injection 5-40 mL  5-40 mL IntraVENous PRN    0.9 % sodium chloride infusion   IntraVENous PRN    ondansetron (ZOFRAN-ODT) disintegrating tablet 4 mg  4 mg Oral Q8H PRN    Or    ondansetron (ZOFRAN) injection 4 mg  4 mg IntraVENous Q6H PRN    piperacillin-tazobactam (ZOSYN) 3,375 mg in sodium chloride 0.9 % 50 mL IVPB (mini-bag)  3,375 mg IntraVENous Q8H    sodium chloride flush 0.9 % injection 5-40 mL  5-40 mL IntraVENous 2 times per day    sodium chloride flush 0.9 % injection 5-40 mL  5-40 mL IntraVENous PRN    0.9 % sodium chloride infusion   IntraVENous PRN    magnesium sulfate 2000 mg in 50 mL IVPB premix  
                                                             LILLY MCCORMICK [facility]  [address] [place], [place] [address] [phone]      Hospitalist  Progress Note      NAME:       Charito Singh   :        1975  MRM:        [id number]    Date of service: 2024      Subjective: Patient seen and examined by me. Patient admitted with colon mass. She says she feels ok today and denies any particular complaints or questions.    Objective:    Vital Signs:    /61   Pulse 68   Temp 98.4 °F (36.9 °C) (Oral)   Resp 16   Ht 1.626 m (5' 4.02\")   Wt 69 kg (152 lb 1.9 oz)   LMP 2024 (Approximate)   SpO2 99%   BMI 26.10 kg/m²        Intake/Output Summary (Last 24 hours) at 2024 1401  Last data filed at 2024 0924  Gross per 24 hour   Intake 0 ml   Output --   Net 0 ml          Current inpatient medications reviewed:  Current Facility-Administered Medications   Medication Dose Route Frequency    pantoprazole (PROTONIX) tablet 40 mg  40 mg Oral QAM AC    sodium chloride flush 0.9 % injection 5-40 mL  5-40 mL IntraVENous PRN    0.9 % sodium chloride infusion   IntraVENous PRN    ondansetron (ZOFRAN-ODT) disintegrating tablet 4 mg  4 mg Oral Q8H PRN    Or    ondansetron (ZOFRAN) injection 4 mg  4 mg IntraVENous Q6H PRN    piperacillin-tazobactam (ZOSYN) 3,375 mg in sodium chloride 0.9 % 50 mL IVPB (mini-bag)  3,375 mg IntraVENous Q8H    sodium chloride flush 0.9 % injection 5-40 mL  5-40 mL IntraVENous 2 times per day    sodium chloride flush 0.9 % injection 5-40 mL  5-40 mL IntraVENous PRN    0.9 % sodium chloride infusion   IntraVENous PRN    magnesium sulfate 2000 mg in 50 mL IVPB premix  2,000 mg IntraVENous PRN    ondansetron (ZOFRAN-ODT) disintegrating tablet 4 mg  4 mg Oral Q8H PRN    Or    ondansetron (ZOFRAN) injection 4 mg  4 mg IntraVENous Q6H PRN    polyethylene glycol (GLYCOLAX) packet 17 g  17 g Oral Daily PRN    acetaminophen (TYLENOL) 
                                                             LILLY MCCORMICK [facility]  [address] [place], [place] [address] [phone]      Hospitalist  Progress Note      NAME:       Charito Singh   :        1975  MRM:        [id number]    Date of service: 2024      Subjective: Patient seen and examined by me. Sp laparoscopic diverting colostomy. Discussed with patient and  at bedside via . Patient states pain overall well controlled; does report some numbness at surgical site when she is about to have a bowel movent - discussed surgical site looks good but colrectal can evaluate in am. No f/c.     Objective:    Vital Signs:    /64   Pulse 67   Temp 98.2 °F (36.8 °C) (Oral)   Resp 19   Ht 1.626 m (5' 4.02\")   Wt 69 kg (152 lb 1.9 oz)   SpO2 100%   BMI 26.10 kg/m²        Intake/Output Summary (Last 24 hours) at 2024 1052  Last data filed at 2024  Gross per 24 hour   Intake --   Output 100 ml   Net -100 ml          Current inpatient medications reviewed:  Current Facility-Administered Medications   Medication Dose Route Frequency    acetaminophen (TYLENOL) tablet 1,000 mg  1,000 mg Oral Q6H    ketorolac (TORADOL) injection 15 mg  15 mg IntraVENous q8h    diatrizoate meglumine-sodium (GASTROGRAFIN) 66-10 % solution 30 mL  30 mL Oral ONCE PRN    dextrose 5 % and 0.45 % NaCl with KCl 20 mEq infusion   IntraVENous Continuous    oxyCODONE (ROXICODONE) immediate release tablet 5 mg  5 mg Oral Q4H PRN    oxyCODONE (ROXICODONE) immediate release tablet 10 mg  10 mg Oral Q4H PRN    HYDROmorphone (DILAUDID) injection 0.5 mg  0.5 mg IntraVENous Q4H PRN    pantoprazole (PROTONIX) tablet 40 mg  40 mg Oral QAM AC    sodium chloride flush 0.9 % injection 5-40 mL  5-40 mL IntraVENous PRN    0.9 % sodium chloride infusion   IntraVENous PRN    ondansetron (ZOFRAN-ODT) disintegrating tablet 4 mg  4 mg Oral Q8H PRN    Or    ondansetron (ZOFRAN) 
                                                             LILLY MCCORMICK [facility]  [address] [place] [address]  [phone]      Hospitalist  Progress Note      NAME:       Charito Singh   :        1975  MRM:        [id number]    Date of service: 2024      Subjective: Patient seen and examined by me. Patient admitted with colon mass. Discussed with her through a video Somali interpretor. She says she has less blood in stool and denies any abdominal symptoms. No dysuria or frequency     Objective:    Vital Signs:    /70   Pulse 78   Temp 98.1 °F (36.7 °C) (Oral)   Resp 18   Ht 1.626 m (5' 4\")   Wt 69 kg (152 lb 1.9 oz)   LMP 2024 (Approximate)   SpO2 100%   BMI 26.11 kg/m²      No intake or output data in the 24 hours ending 24 1344     Current inpatient medications reviewed:  Current Facility-Administered Medications   Medication Dose Route Frequency    morphine (PF) injection 2 mg  2 mg IntraVENous Once    sodium chloride 0.9 % bolus 500 mL  500 mL IntraVENous Once    pantoprazole (PROTONIX) tablet 40 mg  40 mg Oral BID AC    sodium chloride flush 0.9 % injection 5-40 mL  5-40 mL IntraVENous 2 times per day    sodium chloride flush 0.9 % injection 5-40 mL  5-40 mL IntraVENous PRN    0.9 % sodium chloride infusion   IntraVENous PRN    magnesium sulfate 2000 mg in 50 mL IVPB premix  2,000 mg IntraVENous PRN    ondansetron (ZOFRAN-ODT) disintegrating tablet 4 mg  4 mg Oral Q8H PRN    Or    ondansetron (ZOFRAN) injection 4 mg  4 mg IntraVENous Q6H PRN    polyethylene glycol (GLYCOLAX) packet 17 g  17 g Oral Daily PRN    acetaminophen (TYLENOL) tablet 650 mg  650 mg Oral Q6H PRN    Or    acetaminophen (TYLENOL) suppository 650 mg  650 mg Rectal Q6H PRN    0.9 % sodium chloride infusion   IntraVENous PRN    ondansetron (ZOFRAN) injection 4 mg  4 mg IntraVENous BID    polyethylene glycol (GoLYTELY) solution 2,000 mL  2,000 mL Oral Q24H 
                                                             LILLY MCCORMICK [facility]  [address] [place], [place] [address] [phone]      Hospitalist  Progress Note      NAME:       Charito Singh   :        1975  MRM:        [id number]    Date of service: 2024      Subjective: Patient seen and examined by me. Sp laparoscopic diverting colostomy. States pain well controlled.  No further fevers    Objective:    Vital Signs:    /62   Pulse 77   Temp 98.6 °F (37 °C)   Resp 17   Ht 1.626 m (5' 4.02\")   Wt 69 kg (152 lb 1.9 oz)   SpO2 100%   BMI 26.10 kg/m²        Intake/Output Summary (Last 24 hours) at 2024 121  Last data filed at 2024  Gross per 24 hour   Intake --   Output 300 ml   Net -300 ml          Current inpatient medications reviewed:  Current Facility-Administered Medications   Medication Dose Route Frequency    acetaminophen (TYLENOL) tablet 1,000 mg  1,000 mg Oral Q6H    ketorolac (TORADOL) injection 15 mg  15 mg IntraVENous q8h    diatrizoate meglumine-sodium (GASTROGRAFIN) 66-10 % solution 30 mL  30 mL Oral ONCE PRN    dextrose 5 % and 0.45 % NaCl with KCl 20 mEq infusion   IntraVENous Continuous    oxyCODONE (ROXICODONE) immediate release tablet 5 mg  5 mg Oral Q4H PRN    oxyCODONE (ROXICODONE) immediate release tablet 10 mg  10 mg Oral Q4H PRN    HYDROmorphone (DILAUDID) injection 0.5 mg  0.5 mg IntraVENous Q4H PRN    pantoprazole (PROTONIX) tablet 40 mg  40 mg Oral QAM AC    sodium chloride flush 0.9 % injection 5-40 mL  5-40 mL IntraVENous PRN    0.9 % sodium chloride infusion   IntraVENous PRN    ondansetron (ZOFRAN-ODT) disintegrating tablet 4 mg  4 mg Oral Q8H PRN    Or    ondansetron (ZOFRAN) injection 4 mg  4 mg IntraVENous Q6H PRN    piperacillin-tazobactam (ZOSYN) 3,375 mg in sodium chloride 0.9 % 50 mL IVPB (mini-bag)  3,375 mg IntraVENous Q8H    sodium chloride flush 0.9 % injection 5-40 mL  5-40 mL IntraVENous 2 
                                                             LILLY MCCORMICK [facility]  [address] [place], [place] [address] [phone]      Hospitalist  Progress Note      NAME:       Charito Singh   :        1975  MRM:        [id number]    Date of service: 2024      Subjective: Patient seen and examined by me. Sp laparoscopic diverting colostomy. States pain well controlled.  Febrile to 101.1 this am     Objective:    Vital Signs:    /60   Pulse 81   Temp (!) 101.1 °F (38.4 °C) (Oral)   Resp 17   Ht 1.626 m (5' 4.02\")   Wt 69 kg (152 lb 1.9 oz)   LMP 2024 (Approximate)   SpO2 98%   BMI 26.10 kg/m²        Intake/Output Summary (Last 24 hours) at 2024 1154  Last data filed at 2024 0300  Gross per 24 hour   Intake --   Output 0 ml   Net 0 ml          Current inpatient medications reviewed:  Current Facility-Administered Medications   Medication Dose Route Frequency    acetaminophen (TYLENOL) tablet 1,000 mg  1,000 mg Oral Q6H    ketorolac (TORADOL) injection 15 mg  15 mg IntraVENous q8h    dextrose 5 % and 0.45 % NaCl with KCl 20 mEq infusion   IntraVENous Continuous    oxyCODONE (ROXICODONE) immediate release tablet 5 mg  5 mg Oral Q4H PRN    oxyCODONE (ROXICODONE) immediate release tablet 10 mg  10 mg Oral Q4H PRN    HYDROmorphone (DILAUDID) injection 0.5 mg  0.5 mg IntraVENous Q4H PRN    pantoprazole (PROTONIX) tablet 40 mg  40 mg Oral QAM AC    sodium chloride flush 0.9 % injection 5-40 mL  5-40 mL IntraVENous PRN    0.9 % sodium chloride infusion   IntraVENous PRN    ondansetron (ZOFRAN-ODT) disintegrating tablet 4 mg  4 mg Oral Q8H PRN    Or    ondansetron (ZOFRAN) injection 4 mg  4 mg IntraVENous Q6H PRN    piperacillin-tazobactam (ZOSYN) 3,375 mg in sodium chloride 0.9 % 50 mL IVPB (mini-bag)  3,375 mg IntraVENous Q8H    sodium chloride flush 0.9 % injection 5-40 mL  5-40 mL IntraVENous 2 times per day    sodium chloride flush 0.9 % 
                                                             LILLY MCCORMICK [facility]  [address] [place] [address]  [phone]      Hospitalist  Progress Note      NAME:       Charito Singh   :        1975  MRM:        [id number]    Date of service: 2024      Subjective: Patient seen and examined by me. Patient admitted with colon mass. She says she feels ok today and denies any particular complaints.    Objective:    Vital Signs:    /66   Pulse 79   Temp 98.4 °F (36.9 °C) (Oral)   Resp 18   Ht 1.626 m (5' 4\")   Wt 69 kg (152 lb 1.9 oz)   LMP 2024 (Approximate)   SpO2 97%   BMI 26.11 kg/m²        Intake/Output Summary (Last 24 hours) at 2024 1322  Last data filed at 2024 0840  Gross per 24 hour   Intake 600 ml   Output --   Net 600 ml          Current inpatient medications reviewed:  Current Facility-Administered Medications   Medication Dose Route Frequency    sodium chloride flush 0.9 % injection 5-40 mL  5-40 mL IntraVENous PRN    0.9 % sodium chloride infusion   IntraVENous PRN    ondansetron (ZOFRAN-ODT) disintegrating tablet 4 mg  4 mg Oral Q8H PRN    Or    ondansetron (ZOFRAN) injection 4 mg  4 mg IntraVENous Q6H PRN    piperacillin-tazobactam (ZOSYN) 3,375 mg in sodium chloride 0.9 % 50 mL IVPB (mini-bag)  3,375 mg IntraVENous Q8H    pantoprazole (PROTONIX) tablet 40 mg  40 mg Oral BID AC    sodium chloride flush 0.9 % injection 5-40 mL  5-40 mL IntraVENous 2 times per day    sodium chloride flush 0.9 % injection 5-40 mL  5-40 mL IntraVENous PRN    0.9 % sodium chloride infusion   IntraVENous PRN    magnesium sulfate 2000 mg in 50 mL IVPB premix  2,000 mg IntraVENous PRN    ondansetron (ZOFRAN-ODT) disintegrating tablet 4 mg  4 mg Oral Q8H PRN    Or    ondansetron (ZOFRAN) injection 4 mg  4 mg IntraVENous Q6H PRN    polyethylene glycol (GLYCOLAX) packet 17 g  17 g Oral Daily PRN    acetaminophen (TYLENOL) tablet 650 mg  
                                                             LILLY MCCORMICK [facility]  [address] [place] [address]  [phone]      Hospitalist  Progress Note      NAME:       Charito Singh   :        1975  MRM:        [id number]    Date of service: 2024      Subjective: Patient seen and examined by me. Patient admitted with colon mass. She says she feels much better with less abdominal discomfort, having bowel movements.   Objective:    Vital Signs:    /63   Pulse 70   Temp 98.2 °F (36.8 °C) (Oral)   Resp 16   Ht 1.626 m (5' 4\")   Wt 69 kg (152 lb 1.9 oz)   LMP 2024 (Approximate)   SpO2 98%   BMI 26.11 kg/m²      No intake or output data in the 24 hours ending 24 1233       Current inpatient medications reviewed:  Current Facility-Administered Medications   Medication Dose Route Frequency    sodium chloride flush 0.9 % injection 5-40 mL  5-40 mL IntraVENous PRN    0.9 % sodium chloride infusion   IntraVENous PRN    ondansetron (ZOFRAN-ODT) disintegrating tablet 4 mg  4 mg Oral Q8H PRN    Or    ondansetron (ZOFRAN) injection 4 mg  4 mg IntraVENous Q6H PRN    piperacillin-tazobactam (ZOSYN) 3,375 mg in sodium chloride 0.9 % 50 mL IVPB (mini-bag)  3,375 mg IntraVENous Q8H    pantoprazole (PROTONIX) tablet 40 mg  40 mg Oral BID AC    sodium chloride flush 0.9 % injection 5-40 mL  5-40 mL IntraVENous 2 times per day    sodium chloride flush 0.9 % injection 5-40 mL  5-40 mL IntraVENous PRN    0.9 % sodium chloride infusion   IntraVENous PRN    magnesium sulfate 2000 mg in 50 mL IVPB premix  2,000 mg IntraVENous PRN    ondansetron (ZOFRAN-ODT) disintegrating tablet 4 mg  4 mg Oral Q8H PRN    Or    ondansetron (ZOFRAN) injection 4 mg  4 mg IntraVENous Q6H PRN    polyethylene glycol (GLYCOLAX) packet 17 g  17 g Oral Daily PRN    acetaminophen (TYLENOL) tablet 650 mg  650 mg Oral Q6H PRN    Or    acetaminophen (TYLENOL) suppository 650 mg  650 
     Nutrition Note    Brief follow up - pt on Clear liquid diet. Pt ate a little bit of Jello. Reports being extremely tired and not very hungry yet. Encouraged trial of Ensure Clear. Discussed small frequent trials when awake. Denies nausea. Unsure of ostomy output at this time. When diet able to advance, recommend changing Ensure to Ensure PLUS hp for added kcal/pro.     Electronically signed by Salvatore Feldman RD on 9/26/24 at 12:08 PM EDT    Contact: [phone]    
  [facility] [place] [facility]  [address], [address] [facility] [address]  W: [phone]  F: [phone]      Reason for Visit:   Charito Singh is a 49 y.o. female who is seen today for evaluation of rectal mass.     Hematology/Oncology Treatment History:   None prior     History of Present Illness:   Charito Singh is a pleasant 49 y.o. female who was admitted on 9/18/24  for rectal bleeding since Saturday   ED imaging reveals rectal mass at the junction of the sigmoid colon ( 5.5 x 5.2 x 5.1 cm) / no bowel obstruction, No evidence of distant disease in abd/pelvis. ED labs: WBC 11.8 Hgb 7.6 plts 438     Hx obtained with use of AMN;  # [id number]  Pt states has had lower abd pain and constipation  on and off x 1 yr. Noticed bright red blood on Saturday. Bleeding has now slowed down and appears darker.  Slight weigh loss of 4# since Aug.   Last menstrual cycle in Dec. No prior hx of anemia. Denies SOB.  At home active.   Smoking Hx: denies  ETOH use: denies   Mammogram: 1 week ago: per pt normal results  Colonoscopy: denies  Family hx of cancer: denies     : 3 children (30, 28 and 21 yrs old).  In the states x 6 months/ from [place]    No family at bedside    Interval History:     Pt sitting up in chair at bedside. Family at bedside.     Hx obtained with use of AMN # [id number].   Feeling good; denies pain.  Agrees to port placement for tomorrow.     Review of systems was obtained and pertinent findings reviewed above. Past medical history, social history, family history, medications, and allergies are located in the electronic medical record.    Physical Exam:   Visit Vitals  /70   Pulse 70   Temp 98.4 °F (36.9 °C) (Oral)   Resp 16   Ht 1.626 m (5' 4.02\")   Wt 69 kg (152 lb 1.9 oz)   SpO2 100%   BMI 26.10 kg/m²     ECOG PS: 0  General: no distress  Eyes: anicteric sclerae  HENT: oropharynx clear  Neck: supple  Respiratory: normal respiratory 
 service used for Endoscopy admission workup: [id number] [address]  
9:40 AM  TRANSFER - IN REPORT:    Verbal report received from LIVIA Almanza on Charito Singh  being received from angio lab for routine post-op      Report consisted of patient's Situation, Background, Assessment and   Recommendations(SBAR).     Information from the following report(s) Nurse Handoff Report was reviewed with the receiving nurse.    Opportunity for questions and clarification was provided.      Assessment completed upon patient's arrival to unit and care assumed.     10:50 AM  TRANSFER - OUT REPORT:    Verbal report given to LIVIA Luna on Charito Singh  being transferred to 4th floor for routine progression of patient care       Report consisted of patient's Situation, Background, Assessment and   Recommendations(SBAR).     Information from the following report(s) Nurse Handoff Report was reviewed with the receiving nurse.           Lines:   Implantable Port 10/01/24 Right Subclavian (Active)   Port-a-Cath Status Not Accessed 10/01/24 1025   Criteria for Appropriate Use Long term IV/antibiotic administration 10/01/24 1025   Site Assessment Clean, dry & intact 10/01/24 1025   Dressing Type No dressing;Other (Comment) 10/01/24 1025   Dressing Status Clean, dry & intact 10/01/24 1025       Peripheral IV 09/25/24 Left Hand (Active)   Site Assessment Clean, dry & intact 10/01/24 0800   Line Status Capped;Flushed 10/01/24 0800   Line Care Connections checked and tightened 10/01/24 0800   Phlebitis Assessment No symptoms 10/01/24 0800   Infiltration Assessment 0 10/01/24 0800   Alcohol Cap Used Yes 10/01/24 0800   Dressing Status Clean, dry & intact 10/01/24 0800   Dressing Type Transparent 10/01/24 0800        Opportunity for questions and clarification was provided.      Patient transported with:  Registered Nurse        
CRS  CT images reviewed with Radiologist  No extravasation of oral contrast, minimal free fluid, minimal pneumoperitoneum consistent with recent laparoscopy. No bowel dilitation    Pt re-evalauated. No pain at present. She has walked in hallway 3x today.   Abd: soft, minimally tender  Colostomy: stool in bag    Plan:  Informed pt of CT results. Cont mobilize, pulm toilet. Keep thru weekend for ostomy teaching Monday  
CRS  Pt seen and evaluated with assistance of video   No abd pain  Flowsheet, labs, colonoscopy and MR reviewed    Imp:   Near obstructing mid rectal cancer, 9cm from anal verge, 5cm from anorectal ring    Plan:  Recommend laparoscopic diverting colostomy followed by neoadjuvant therapy. I will see if I can add to OR schedule this week. If not, pt can be dc'd from my perspective with plans to bring her to OR next week for surgery.    Through the video , I have reviewed risks, benefits, alt at great length regarding surgery. She understands risks and agrees to proceed. All questions answered.     WOCN consult today to begin teaching and marking of abdomen    Srinivas Smith MD, FACS  Colon and Rectal Surgery  
CRS  Pt seen in preop with asst of video .  Once more, extensive discussion re: plan for surgery, postop expectations, and long term plan. I have reviewed risks, benefits, alternatives at great length. Understand and agree to proceed. All questions answered.  
CRS  Pt seen with Vide   Minimal pain. Tony pain in back. Good appetite. Ambulating  Flowsheet, labs reviewed  Abd: soft, nt,nd    Colostomy: stool in bag    Inc: c/d/I    Plan:  Ostomy teaching. From CRS perspective, could dc Zosyn. Arrange HH for ostomy support at home. If possible, step by step instructions for changing appliance in Bol[place]ian. From my perspective ok to dc home tomorrow  
CRS  Pt seen with video   C/o upper abdominal pain. +flatus in gas. Pain worse with urinating    Flowsheet, labs reviewed  Abd: soft, approp tender, tender around colostomy site  Colostomy: pink, flat  Inc: c/d/I    Plan:  1) mobilize, IS. Pt with low grade temps  2) follow leukocytosis. Pulm toilet, mobilize. May need CT abd/pel if wbc increases, pain increases, temp increases.  3) hold at clears, cont IVF  4) DVT prophylaxis. Not on heparin or lovenox given bleeding risk from mass which is still in place. Please SCD and LAURYN  5) add scheduled tylenol and toradol for pain control. Increased bleeding risk from toradol offset by pain, which is preventing pt from ambulating and doing IS    Partner covering this weekend    
CRS Note    Seen and examined with assistance of video     Doing better today. Still sore but pain much better than yesterday. Pain medication helps, especially IV. Appetite not great. Colostomy working. Afraid to walk because she feels weak.     Vitals reviewed    Abd soft, nondistended, minimally tender, incisions c/d/I, ostomy viable with stool in the bag    No new labs this morning    CT yesterday reviewed without acute findings    A/P:  49 y.o. F s/p diverting colostomy for obstructing rectal CA  - advance diet to GIS with Ensure supplements  - MUST get OOB/ambulate  - wean IVF once PO improves  - ostomy teaching next week prior to dc    Valentina Hicks MD   [facility]  [phone]    
CRS POS1  Pt seen with asst of video   C/o little pain  Flowsheet, labs reviewed  Abd: soft, aprop tender      Inc: c/d/I      Colostomy: pink, flat    Plan:  DC coombs  Mobilize  IS 10x/hr q1hr while awake  Clears, cont IVF today, nutr supplement  Ostomy teaching  
Comprehensive Nutrition Assessment    Type and Reason for Visit: Initial    Nutrition Recommendations/Plan:   Start Clears/Fulls tomorrow and GI bland Friday (9/27) if tolerated or per Surgery  Add Ensure HP - vanilla preferred  Diet education information left with pt today       Malnutrition Assessment:  Malnutrition Status:  Mild malnutrition (09/25/24 1115)    Context:  Acute Illness     Findings of the 6 clinical characteristics of malnutrition:  Energy Intake:  75% or less of estimated energy requirements for 7 or more days  Weight Loss:  1% to 2% over 1 week     Body Fat Loss:  No significant body fat loss     Muscle Mass Loss:  No significant muscle mass loss    Fluid Accumulation:  No significant fluid accumulation     Strength:  Not Performed       Nutrition Assessment:    Past medical hx:       Diagnosis Date    Constipation      Pt screened for LOS. Pt reports mild wt loss PTA (~5 lb) and likely more wt loss while admitted due to reported poor PO. Pt did eat well yesterday. Pt NPO today for surgery - ileostomy. Pt family helped provide hx. Pt had stopped eating red meat a while back because it bothered her stomach. Otherwise no allergies and no real preferences. No chew/swallow difficulties. Monitor diet advancement/tolerance. Will follow up tomorrow for diet/food questions related to handout.          Current Nutrition Therapies:  Diet NPO  Meal Intake:   Patient Vitals for the past 168 hrs:   PO Meals Eaten (%)   09/24/24 0840 76 - 100%     Supplement Intake:  No data found.  Nutrition Support: n/a    Nutritionally Significant Medications:  Scheduled Meds:   pantoprazole  40 mg Oral QAM AC    piperacillin-tazobactam  3,375 mg IntraVENous Q8H    sodium chloride flush  5-40 mL IntraVENous 2 times per day     Continuous Infusions:   sodium chloride      sodium chloride      sodium chloride       PRN Meds:.sodium chloride flush, sodium chloride, ondansetron **OR** ondansetron, sodium chloride flush, 
Discharged instruction given to patient.all question and concerned address appropriately.all iv removed no complication noted.personal item packed up with patient.colostomy supply given to patient.patient transported via wheelchair by volunteer services to the main entranced.  
[facility]  [address] [place] [address] [phone]      Hospitalist Progress Note      NAME: Charito Singh   :  1975  MRM:  [id number]    Date of service: 2024  8:59 AM       Assessment and Plan:   Mass of colon with Rectal bleeding POA: CT scan:  large mass at the junction of the sigmoid colon and rectum measuring approximately 5.5 x 5.2 x 5.1 cm (series 305 image 143). There  is exophytic extension of the mass through the wall of the rectum into the left perirectal fat with surrounding fat stranding.  CR surgery evaluated pt. GI is consulted for possible coloscopy with Bx. GI consulted.      2.  Acute blood loss anemia POA: likely VAUGHN due to ongoing blood loss. Cont to monitor H/H Q8 hrs and transfuse as needed.       3.  Dizziness POA: likely from the anemia. Monitor         Subjective:     Chief Complaint:: Patient was seen and examined as a follow up for colon mass.  Chart was reviewed. denies abdominal pain or vomiting     ROS:  (bold if positive, if negative)    Tolerating PT  Tolerating Diet     v   Objective:     Last 24hrs VS reviewed since prior progress note. Most recent are:    Vitals:    24 0728   BP: 101/67   Pulse: 73   Resp: 16   Temp: 98.8 °F (37.1 °C)   SpO2: 97%     SpO2 Readings from Last 6 Encounters:   24 97%          Intake/Output Summary (Last 24 hours) at 2024 0859  Last data filed at 2024 0705  Gross per 24 hour   Intake 350.42 ml   Output --   Net 350.42 ml        Physical Exam:    Gen:  Well-developed, well-nourished, in no acute distress  HEENT:  Pink conjunctivae, PERRL, hearing intact to voice, moist mucous membranes  Neck:  Supple, without masses, thyroid non-tender  Resp:  No accessory muscle use, clear breath sounds without wheezes rales or rhonchi  Card:  No murmurs, normal S1, S2 without thrills, bruits or peripheral edema  Abd:  Soft, non-tender, non-distended, normoactive bowel sounds 
PHYSICAL THERAPY EVALUATION/DISCHARGE    Patient: Charito Singh (49 y.o. female)  Date: 9/27/2024  Primary Diagnosis: Rectal bleeding [K62.5]  Colonic mass [K63.89]  Procedure(s) (LRB):  LAPAROSCOPIC DIVERTING COLOSTOMY (N/A) 2 Days Post-Op   Precautions:                        ASSESSMENT AND RECOMMENDATIONS:  Based on the objective data below, the patient participated with the assistance of a video  (Upper sorbian).  Patient reports walking in the room and in the bathroom without difficulty.  States yesterday she had stomach pain and was not feeling well and not moving well because of abdominal pain.  No pain reported today.  Patient participated in transfer training sit <> stand independently and gait training without an assistive device without loss of balance or path deviations.     Functional Outcome Measure:  The patient scored 22 out of 24 on the [facility] outcome measure.      Further skilled acute physical therapy is not indicated at this time.       PLAN :  Recommendation for discharge: (in order for the patient to meet his/her long term goals):   No skilled physical therapy    Other factors to consider for discharge: no additional factors    IF patient discharges home will need the following DME: none       SUBJECTIVE:   Patient stated “Charito.”  When asked her name    OBJECTIVE DATA SUMMARY:     Past Medical History:   Diagnosis Date    Constipation      Past Surgical History:   Procedure Laterality Date    BREAST BIOPSY Right     10 years ago    COLECTOMY N/A 9/25/2024    LAPAROSCOPIC DIVERTING COLOSTOMY performed by Srinivas Smith MD at [facility] MAIN OR    COLONOSCOPY N/A 9/20/2024    COLONOSCOPY DIAGNOSTIC performed by Jonny Mckeon MD at [facility] ENDOSCOPY    COLONOSCOPY N/A 9/20/2024    COLONOSCOPY BIOPSY performed by Jonny Mckeon MD at [facility] ENDOSCOPY       Home Situation and Prior Level of Function: no fall history, amb without an AD  Social/Functional History  Lives With: Friend(s)  Type of 
Patient Fall protocol  Yellow arm band applied to patient.  Yellow non-skid socks placed on Patient.    Bed in low position, all side rails up, call bell in reach.  Patient and family instructed on \"Call Don't Fall\" Protocol   -Use your call bell, wait for assistance, staff (not Family) will assist you to get up and move about.  Patient and Family verbalize understanding of Fall Precautions and the \" Call Don't Fall\" Protocol    
Spiritual Health History and Assessment/Progress Note  [facility]    Initial Encounter,  ,  ,      Name: Charito Singh MRN: [id number]    Age: 49 y.o.     Sex: female   Language: Azerbaijani   Mandaen: None   Mass of colon     Date: 9/25/2024            Total Time Calculated: 28 min              Spiritual Assessment began in [facility]            Encounter Overview/Reason: Initial Encounter  Service Provided For: Patient and family together    Yesica, Belief, Meaning:   Patient identifies as spiritual, is connected with a yesica tradition or spiritual practice, and has beliefs or practices that help with coping during difficult times  Family/Friends identify as spiritual, are connected with a yesica tradition or spiritual practice, and have beliefs or practices that help with coping during difficult times      Importance and Influence:  Patient has spiritual/personal beliefs that influence decisions regarding their health  Family/Friends have spiritual/personal beliefs that influence decisions regarding the patient's health    Community:  Patient is connected with a spiritual community and feels well-supported. Support system includes: Spouse/Partner, Yesica Community, Friends, and Extended family  Family/Friends are connected with a spiritual community: and feel well-supported. Support system includes: Spouse/Partner, Yesica Community, Friends, and Extended family    Assessment and Plan of Care:     Patient Interventions include: Facilitated expression of thoughts and feelings, Explored spiritual coping/struggle/distress, Engaged in theological reflection, Affirmed coping skills/support systems, and Facilitated life review and/ or legacy  Family/Friends Interventions include: Facilitated expression of thoughts and feelings, Explored spiritual coping/struggle/distress, Engaged in theological reflection, Affirmed coping skills/support systems, and Facilitated life review and/or legacy    Patient 
Ultrasound IV by  : Yudy Dunlap RN   Procedure Note    Ultrasound IV education provided to patient. Opportunities for questions given.     Ultrasound used for PIV placement:  20 gauge 5.7 cm AccuCath Ace 5.7cm  right cephalic} location.  1 X Attempt(s).    Vigorous blood return present and saline flushes with ease.     Procedure tolerated well. Primary RN aware of IV placement and added to LDA.      CHELA BELLO, RN   
 IntraVENous PRN       Physical Examination:    General:   not in any acute distress  Eyes:   pink conjunctivae, PERRLA with no discharge.  Pulm: clear breath sounds without crackles or wheezes  Card:  no JVD or murmurs, has regular and normal S1, S2 without thrills, bruits  Abd:  Soft, non-tender, non-distended, normoactive bowel sounds, ostomy bag in place w/ stool   Musc:  No cyanosis, clubbing, atrophy or deformities.  Neuro: Awake and alert. Generally a non focal exam.   Psych:  Has a fair insight and is oriented x 3    Diagnostic testing:    Laboratory data reviewed and independently interpreted:    Recent Labs     09/28/24  0645 09/29/24  0620 09/30/24  0159   WBC 9.3 6.9 7.2   HGB 8.8* 8.6* 8.5*   HCT 29.9* 28.9* 28.7*   RBC 4.12 4.03 4.06   MCV 72.6* 71.7* 70.7*   MCH 21.4* 21.3* 20.9*    346 408*     No results found for: \"LACTA\"  Recent Labs     09/28/24  0645 09/29/24  0620 09/30/24  0159    142 144   K 3.6 3.5 3.3*   * 110* 113*   CO2 28 29 27   GLUCOSE 93 103* 138*   BUN 3* 4* 3*   CREATININE 0.39* 0.35* 0.43*   CALCIUM 8.9 8.8 9.0   MG  --  2.2 2.0       No components found for: \"GLUCOSEPOC\"  Lab Results   Component Value Date/Time    CHOL 193 08/15/2024 03:35 PM    TRIG 228 08/15/2024 03:35 PM    HDL 46 08/15/2024 03:35 PM     Imaging data reviewed:    CT CHEST W CONTRAST    Result Date: 9/18/2024  Normal study. Electronically signed by SUSY NOBLE    CT ABDOMEN PELVIS W WO CONTRAST Additional Contrast? None    Result Date: 9/18/2024  1. Large mass at the junction of the sigmoid colon and rectum measuring approximately 5.5 x 5.2 x 5.1 cm, highly suspicious for primary colon malignancy. There is exophytic extension of the mass through the wall of the rectum into the left perirectal fat with surrounding fat stranding and multiple small nodules, likely representing locoregional metastasis. 2. No evidence of active GI bleeding. No evidence of bowel obstruction. 3. No evidence of 
BID    polyethylene glycol (GoLYTELY) solution 2,000 mL  2,000 mL Oral Q24H    iron sucrose (VENOFER) injection 200 mg  200 mg IntraVENous Q24H       Physical Examination:    General:   Weak and ill looking patient in no acute distress  Eyes:   pink conjunctivae, PERRLA with no discharge.  ENT:   no ottorrhea or rhinorrhea with dry mucous membranes  Neck: no masses, thyroid non-tender and trachea central.  Pulm: clear breath sounds without crackles or wheezes  Card:  no JVD or murmurs, has regular and normal S1, S2 without thrills, bruits  Abd:  Soft, non-tender, non-distended, normoactive bowel sounds   Musc:  No cyanosis, clubbing, atrophy or deformities.  Skin:  No rashes, bruising or ulcers.   Neuro: Awake and alert. Generally a non focal exam.   Psych:  Has a fair insight and is oriented x 3    Diagnostic testing:    Laboratory data reviewed and independently interpreted:    Recent Labs     09/18/24  0033 09/18/24  1051 09/19/24  0428 09/19/24  0549 09/20/24  0412   WBC 11.8*  --  8.2  --   --    HGB 7.6*   < > 9.0* 9.1* 8.9*   HCT 26.6*   < > 29.9* 30.7* 30.2*   RBC 4.16  --  4.47  --   --    MCV 63.9*  --  66.9*  --   --    MCH 18.3*  --  20.1*  --   --    *  --  379  --   --     < > = values in this interval not displayed.     No results found for: \"LACTA\"  Recent Labs     09/18/24  0033 09/19/24 0428    139   K 3.7 3.7   * 107   CO2 26 25   GLUCOSE 99 100   BUN 10 7   CREATININE 0.69 0.59   CALCIUM 9.1 9.2   MG 2.3  --    BILITOT 0.3 0.6   ALKPHOS 79 70   AST 12* 12*   ALT 18 15     No components found for: \"GLUCOSEPOC\"  Lab Results   Component Value Date/Time    CHOL 193 08/15/2024 03:35 PM    TRIG 228 08/15/2024 03:35 PM    HDL 46 08/15/2024 03:35 PM     Imaging data reviewed:    CT CHEST W CONTRAST    Result Date: 9/18/2024  Normal study. Electronically signed by SUSY NOBLE    CT ABDOMEN PELVIS W WO CONTRAST Additional Contrast? None    Result Date: 9/18/2024  1. Large mass at the 
sodium chloride flush, sodium chloride, ondansetron **OR** ondansetron, sodium chloride flush, sodium chloride, magnesium sulfate, sodium chloride      Estimated Daily Nutrient Needs:  Energy Requirements Based On: Formula  Weight Used for Energy Requirements: Current  Energy (kcal/day): 1950 (MSJ x 1.3x1.2)  Weight Used for Protein Requirements: Current  Protein (g/day): 90 (1.3g/kg)  Method Used for Fluid Requirements: 1 ml/kcal  Fluid (ml/day): 1950    Nutrition Related Findings:   Edema: None                    Bowel Movement:  9/30/2024    Wounds: Wound Type: None      Anthropometric Measures:  Height: 162.6 cm (5' 4.02\")  Ideal Body Weight (IBW): 120 lbs (55 kg)       Current Body Weight: 69 kg (152 lb 1.9 oz), 126.8 % IBW. Weight Source: Bed Scale  Current BMI (kg/m2): 26.1        Weight Adjustment For: No Adjustment                 BMI Categories: Overweight (BMI 25.0-29.9)    Wt Readings from Last 20 Encounters:   09/18/24 69 kg (152 lb 1.9 oz)   08/28/24 69.4 kg (153 lb)           Nutrition Diagnosis:   Impaired nutrient utilization, Unintended weight loss related to altered GI structure as evidenced by NPO or clear liquid status due to medical condition, GI abnormality    Nutrition Interventions:   Food and/or Nutrient Delivery: Start Oral Diet, Start Oral Nutrition Supplement  Nutrition Education/Counseling: No recommendation at this time  Coordination of Nutrition Care: Continue to monitor while inpatient, Interdisciplinary Rounds       Goals:  Previous Goal Met: Progressing toward Goal(s)  Goals: Meet at least 75% of estimated needs, by next RD assessment       Nutrition Monitoring and Evaluation:   Behavioral-Environmental Outcomes: Knowledge or Skill  Food/Nutrient Intake Outcomes: Food and Nutrient Intake, Supplement Intake  Physical Signs/Symptoms Outcomes: Biochemical Data, Weight, Skin, GI Status    Discharge Planning:    Continue Oral Nutrition Supplement, Continue current diet     Salvatore CORNEJO

## 2024-10-01 NOTE — DISCHARGE SUMMARY
Discharge Summary   Please note that this dictation was completed with [employer], the computer voice recognition software.  Quite often unanticipated grammatical, syntax, homophones, and other interpretive errors are inadvertently transcribed by the computer software.  Please disregard these errors.  Please excuse any errors that have escaped final proofreading.    PATIENT ID: Charito Singh  MRN: [id number]   YOB: 1975    DATE OF ADMISSION: 9/18/2024 12:19 AM    DATE OF DISCHARGE: 10/1/2024  PRIMARY CARE PROVIDER: None, None         ATTENDING PHYSICIAN: VINICIO STREET MD  DISCHARGING PROVIDER: VINICIO STREET MD       CONSULTATIONS: IP CONSULT TO COLORECTAL SURGERY  IP CONSULT TO GI  IP CONSULT TO GI  IP CONSULT TO ONCOLOGY  IP CONSULT HOME HEALTH  IP CONSULT HOME HEALTH    PROCEDURES/SURGERIES: Procedure(s):  LAPAROSCOPIC DIVERTING COLOSTOMY    ADMITTING HPI from excerpted H&P   49 y.o. female who is being admitted for rectal bleeding due to a mass of colon. Ms. Luciano Singh presented to our Emergency Department today complaining of a now persistent rectal bleeding for the past several days. Blood consists of clots. Has associated nausea, vomiting and a lower cramping abdominal discomfort. She has been constipated for a while now. No prior colonoscopies. She has lost some weight unintentionally. In the ED, she was found to have a Hgb of 7.6 and a CT scan abdomen and pelvis done showed a large mass at the junction of the sigmoid colon and rectum measuring approximately 5.5 x 5.2 x 5.1 cm, highly suspicious for primary colon malignancy. There is exophytic extension of the mass through the wall of the rectum into the left perirectal fat with surrounding fat stranding and multiple small nodules, likely representing locoregional metastasis. No evidence of active GI bleeding. No evidence of bowel obstruction. No evidence of distant metastatic disease in the abdomen or pelvis. She will be

## 2024-10-01 NOTE — DISCHARGE INSTRUCTIONS
Usted llegó al hospital con sangrado del recto y se descubrió que tenía douglas masa en el recto que se identificó gold cáncer después de la biopsia. Había preocupación por la obstrucción del colon debido a esta masa. Le sometieron a douglas cirugía para crear douglas ostomía para que salieran las heces. Marin un seguimiento con la clínica de cuidado de heridas y el oncólogo.

## 2024-10-01 NOTE — H&P
Heri Luciano [facility]  [address] [place]  [address]  [phone]    Hospital Medicine History and Physical      NAME:       Charito Singh   :       1975   MRN:      [id number]     Date of service:   2024     Chief  Complaint:  Rectal bleeding     History Of Presenting Illness:       Ms. Luciano Singh is a 49 y.o. female who is being admitted for rectal bleeding due to a mass of colon. Ms. Luciano Singh presented to our Emergency Department today complaining of a now persistent rectal bleeding for the past several days. Blood consists of clots. Has associated nausea, vomiting and a lower cramping abdominal discomfort. She has been constipated for a while now. No prior colonoscopies. She has lost some weight unintentionally. In the ED, she was found to have a Hgb of 7.6 and a CT scan abdomen and pelvis done showed a large mass at the junction of the sigmoid colon and rectum measuring approximately 5.5 x 5.2 x 5.1 cm, highly suspicious for primary colon malignancy. There is exophytic extension of the mass through the wall of the rectum into the left perirectal fat with surrounding fat stranding and multiple small nodules, likely representing locoregional metastasis. No evidence of active GI bleeding. No evidence of bowel obstruction. No evidence of distant metastatic disease in the abdomen or pelvis. She will be admitted for further management. Discussed with the patient through a Malay video interpretor.    No Known Allergies    Prior to Admission medications    Medication Sig Start Date End Date Taking? Authorizing Provider   ibuprofen (ADVIL;MOTRIN) 200 MG tablet Take 1 tablet by mouth every 6 hours as needed for Pain    Provider, MD [facility]       No past medical history on file.     Past Surgical History:   Procedure Laterality Date 
.Pre-Endoscopy H&P Update  Chief complaint/HPI/ROS:  The indication for the procedure, the patient's history and the patient's current medications are reviewed prior to the procedure and that data is reported on the H&P to which this document is attached.  Any significant complaints with regard to organ systems will be noted, and if not mentioned then a review of systems is not contributory.  Past Medical History:   Diagnosis Date    Constipation       Past Surgical History:   Procedure Laterality Date    BREAST BIOPSY Right     10 years ago     Social   Social History     Tobacco Use    Smoking status: Never     Passive exposure: Never    Smokeless tobacco: Never   Substance Use Topics    Alcohol use: Never      History reviewed. No pertinent family history.   No Known Allergies   Prior to Admission Medications   Prescriptions Last Dose Informant Patient Reported? Taking?   ibuprofen (ADVIL;MOTRIN) 200 MG tablet 9/12/2024  Yes No   Sig: Take 1 tablet by mouth every 6 hours as needed for Pain      Facility-Administered Medications: None       PHYSICAL EXAM:  The patient is examined immediately prior to the procedure.  Vitals:    09/20/24 1341   BP: 124/63   Pulse: 90   Resp: 16   Temp: 99 °F (37.2 °C)   SpO2: 100%     Gen: Appears comfortable, no distress.  Pulm: comfortable respirations with no abnormal audible breath sounds  HEART: well perfused, no abnormal audible heart sounds  GI: abdomen flat.    PLAN:  Informed consent discussion held, patient afforded an opportunity to ask questions and all questions answered.  After being advised of the risks, benefits, and alternatives, the patient requested that we proceed and indicated so on a written consent form.      Will proceed with procedure as planned.  Jonny Mckeon MD   
2000 mg in 50 mL IVPB premix  2,000 mg IntraVENous PRN Srinivas Smith MD        0.9 % sodium chloride infusion   IntraVENous PRN Srinivas Smith MD           ALLERGIES  No Known Allergies    DIAGNOSTIC STUDIES   IMAGING STUDIES  Relevant Imaging studies reviewed    LABS  Lab Results   Component Value Date/Time    WBC 8.5 10/01/2024 01:44 AM    HGB 8.3 10/01/2024 01:44 AM    HCT 27.5 10/01/2024 01:44 AM     10/01/2024 01:44 AM    MCV 70.7 10/01/2024 01:44 AM     Lab Results   Component Value Date/Time     09/30/2024 01:59 AM    K 3.3 09/30/2024 01:59 AM     09/30/2024 01:59 AM    CO2 27 09/30/2024 01:59 AM    BUN 3 09/30/2024 01:59 AM     No results found for: \"INR\", \"PT1\"    PHYSICAL EXAM     Vitals:    10/01/24 0714   BP: 116/68   Pulse: 72   Resp: 16   Temp: 98.2 °F (36.8 °C)   SpO2: 99%        General:  NAD  Heart:  RRR  Lungs:  NWOB  Neurological:  AAOX3    PLAN   Procedure to be performed:  US and fluoroscopically guided portacath placement    Plan for sedation:  moderate  Mallampati Airway Assessment:  II (soft palate, uvula, fauces visible)  ASA Classification:  ASA 3 - Patient with moderate systemic disease with functional limitations  Post procedure plan:  observation per protocol  Informed consent:   used; indication, risks and alternatives of the planned procedure and sedation methods reviewed with the patient / family who agree to proceed  Code status:  Full    Case discussed with Mark Lopez MD who agrees with the assessment and plan for this patient.     GHULAM Kumar  [facility]

## 2024-10-01 NOTE — PLAN OF CARE
Problem: Discharge Planning  Goal: Discharge to home or other facility with appropriate resources  9/20/2024 2226 by Aure Lundberg, RN  Outcome: HH/[facility]C Progressing  9/20/2024 1203 by Fatmata Benson, RN  Outcome: HH/[facility]SPC Progressing     Problem: Pain  Goal: Verbalizes/displays adequate comfort level or baseline comfort level  9/20/2024 2226 by Aure Lundberg, RN  Outcome: HH/[facility]C Progressing  9/20/2024 1203 by Fatmata Benson RN  Outcome: HH/HSPC Progressing     Problem: ABCDS Injury Assessment  Goal: Absence of physical injury  9/20/2024 2226 by Aure Lundberg, RN  Outcome: HH/[facility]C Progressing  9/20/2024 1203 by Fatmata Benson RN  Outcome: HH/[facility]C Progressing  Flowsheets (Taken 9/20/2024 1059 by Izzy Jones, RN)  Absence of Physical Injury: Implement safety measures based on patient assessment     
  Problem: Discharge Planning  Goal: Discharge to home or other facility with appropriate resources  9/21/2024 1158 by Fatmata Benson, RN  Outcome: HH/[facility]SPC Progressing  9/20/2024 2226 by Aure Lundberg, RN  Outcome: HH/[facility]SPC Progressing     Problem: Pain  Goal: Verbalizes/displays adequate comfort level or baseline comfort level  9/21/2024 1158 by Fatmata Benson, RN  Outcome: HH/[facility]C Progressing  9/20/2024 2226 by Aure Lundberg, RN  Outcome: HH/[facility]SPC Progressing     Problem: ABCDS Injury Assessment  Goal: Absence of physical injury  9/21/2024 1158 by Fatmata Benson, RN  Outcome: HH/[facility]C Progressing  9/20/2024 2226 by Aure Lundberg RN  Outcome: HH/[facility]C Progressing     Problem: Safety - Adult  Goal: Free from fall injury  Outcome: HH/[facility]SPC Progressing     
  Problem: Discharge Planning  Goal: Discharge to home or other facility with appropriate resources  9/21/2024 2318 by Aure Lundberg RN  Outcome: HH/[facility]SPC Progressing  9/21/2024 1158 by Fatmata Benson, RN  Outcome: HH/[facility]SPC Progressing     Problem: Pain  Goal: Verbalizes/displays adequate comfort level or baseline comfort level  9/21/2024 2318 by Aure Lundberg, RN  Outcome: HH/[facility]SPC Progressing  9/21/2024 1158 by Fatmata Benson, RN  Outcome: HH/HSPC Progressing     Problem: ABCDS Injury Assessment  Goal: Absence of physical injury  9/21/2024 2318 by Aure Lundberg RN  Outcome: HH/HSPC Progressing  9/21/2024 1158 by Fatmata Benson RN  Outcome: HH/[facility]SPC Progressing     Problem: Safety - Adult  Goal: Free from fall injury  9/21/2024 2318 by Aure Lundberg, RN  Outcome: HH/[facility]SPC Progressing  9/21/2024 1158 by Fatmata Benson, RN  Outcome: HH/[facility]SPC Progressing     
  Problem: Discharge Planning  Goal: Discharge to home or other facility with appropriate resources  9/30/2024 0709 by Nikole Tubbs, RN  Outcome: Progressing  Flowsheets (Taken 9/29/2024 2020)  Discharge to home or other facility with appropriate resources:   Identify barriers to discharge with patient and caregiver   Arrange for needed discharge resources and transportation as appropriate   Identify discharge learning needs (meds, wound care, etc)   Arrange for interpreters to assist at discharge as needed     Problem: Pain  Goal: Verbalizes/displays adequate comfort level or baseline comfort level  9/30/2024 0709 by Nikole Tubbs, RN  Outcome: Progressing     Problem: Safety - Adult  Goal: Free from fall injury  9/30/2024 0709 by Nikole Tubbs, RN  Outcome: Progressing     Problem: Skin/Tissue Integrity  Goal: Absence of new skin breakdown  Description: 1.  Monitor for areas of redness and/or skin breakdown  2.  Assess vascular access sites hourly  3.  Every 4-6 hours minimum:  Change oxygen saturation probe site  4.  Every 4-6 hours:  If on nasal continuous positive airway pressure, respiratory therapy assess nares and determine need for appliance change or resting period.  9/30/2024 0709 by Nikole Tubbs, RN  Outcome: Progressing     
  Problem: Discharge Planning  Goal: Discharge to home or other facility with appropriate resources  Outcome: /[facility] Progressing     Problem: Pain  Goal: Verbalizes/displays adequate comfort level or baseline comfort level  Outcome: /[facility] Progressing     Problem: ABCDS Injury Assessment  Goal: Absence of physical injury  Outcome: /[facility] Progressing     
  Problem: Discharge Planning  Goal: Discharge to home or other facility with appropriate resources  [facility]  Flowsheets  Taken 9/23/2024 2003 by Aure Lundberg RN  Discharge to home or other facility with appropriate resources: Identify barriers to discharge with patient and caregiver  Taken 9/23/2024 0752 by Bhumi Danielle, RN  Discharge to home or other facility with appropriate resources: Identify barriers to discharge with patient and caregiver     Problem: Pain  Goal: Verbalizes/displays adequate comfort level or baseline comfort level  Outcome: HH/[facility]SPC Progressing     Problem: ABCDS Injury Assessment  Goal: Absence of physical injury  Outcome: HH/[facility]SPC Progressing     Problem: Safety - Adult  Goal: Free from fall injury  Outcome: /[facility] Progressing     
  Problem: Discharge Planning  Goal: Discharge to home or other facility with appropriate resources  Outcome: /[facility] Progressing     Problem: Pain  Goal: Verbalizes/displays adequate comfort level or baseline comfort level  Outcome: /[facility] Progressing     Problem: ABCDS Injury Assessment  Goal: Absence of physical injury  Outcome: /[facility] Progressing     Problem: Safety - Adult  Goal: Free from fall injury  Outcome: /[facility] Progressing     
  Problem: Discharge Planning  Goal: Discharge to home or other facility with appropriate resources  Outcome: Progressing     Problem: Pain  Goal: Verbalizes/displays adequate comfort level or baseline comfort level  Outcome: Progressing     Problem: ABCDS Injury Assessment  Goal: Absence of physical injury  Outcome: Progressing     
  Problem: Discharge Planning  Goal: Discharge to home or other facility with appropriate resources  Outcome: Progressing     Problem: Pain  Goal: Verbalizes/displays adequate comfort level or baseline comfort level  Outcome: Progressing     Problem: ABCDS Injury Assessment  Goal: Absence of physical injury  Outcome: Progressing     
  Problem: Discharge Planning  Goal: Discharge to home or other facility with appropriate resources  Outcome: Progressing     Problem: Pain  Goal: Verbalizes/displays adequate comfort level or baseline comfort level  Outcome: Progressing     Problem: ABCDS Injury Assessment  Goal: Absence of physical injury  Outcome: Progressing     Problem: Nutrition Deficit:  Goal: Optimize nutritional status  Outcome: Progressing     
  Problem: Discharge Planning  Goal: Discharge to home or other facility with appropriate resources  Outcome: Progressing     Problem: Pain  Goal: Verbalizes/displays adequate comfort level or baseline comfort level  Outcome: Progressing     Problem: ABCDS Injury Assessment  Goal: Absence of physical injury  Outcome: Progressing     Problem: Safety - Adult  Goal: Free from fall injury  Outcome: Progressing     
  Problem: Discharge Planning  Goal: Discharge to home or other facility with appropriate resources  Outcome: Progressing     Problem: Pain  Goal: Verbalizes/displays adequate comfort level or baseline comfort level  Outcome: Progressing     Problem: ABCDS Injury Assessment  Goal: Absence of physical injury  Outcome: Progressing     Problem: Safety - Adult  Goal: Free from fall injury  Outcome: Progressing     Problem: Nutrition Deficit:  Goal: Optimize nutritional status  Outcome: Progressing     Problem: Skin/Tissue Integrity  Goal: Absence of new skin breakdown  Description: 1.  Monitor for areas of redness and/or skin breakdown  2.  Assess vascular access sites hourly  3.  Every 4-6 hours minimum:  Change oxygen saturation probe site  4.  Every 4-6 hours:  If on nasal continuous positive airway pressure, respiratory therapy assess nares and determine need for appliance change or resting period.  Outcome: Progressing     
  Problem: Discharge Planning  Goal: Discharge to home or other facility with appropriate resources  Outcome: Progressing     Problem: Pain  Goal: Verbalizes/displays adequate comfort level or baseline comfort level  Outcome: Progressing     Problem: ABCDS Injury Assessment  Goal: Absence of physical injury  Outcome: Progressing     Problem: Safety - Adult  Goal: Free from fall injury  Outcome: Progressing     Problem: Nutrition Deficit:  Goal: Optimize nutritional status  Outcome: Progressing     Problem: Skin/Tissue Integrity  Goal: Absence of new skin breakdown  Description: 1.  Monitor for areas of redness and/or skin breakdown  2.  Assess vascular access sites hourly  3.  Every 4-6 hours minimum:  Change oxygen saturation probe site  4.  Every 4-6 hours:  If on nasal continuous positive airway pressure, respiratory therapy assess nares and determine need for appliance change or resting period.  Outcome: Progressing     
  Problem: Discharge Planning  Goal: Discharge to home or other facility with appropriate resources  Outcome: Progressing     Problem: Pain  Goal: Verbalizes/displays adequate comfort level or baseline comfort level  Outcome: Progressing  Flowsheets (Taken 9/29/2024 0813)  Verbalizes/displays adequate comfort level or baseline comfort level:   Encourage patient to monitor pain and request assistance   Assess pain using appropriate pain scale   Administer analgesics based on type and severity of pain and evaluate response   Implement non-pharmacological measures as appropriate and evaluate response   Consider cultural and social influences on pain and pain management   Notify Licensed Independent Practitioner if interventions unsuccessful or patient reports new pain     Problem: ABCDS Injury Assessment  Goal: Absence of physical injury  Outcome: Progressing     Problem: Safety - Adult  Goal: Free from fall injury  Outcome: Progressing     Problem: Nutrition Deficit:  Goal: Optimize nutritional status  Outcome: Progressing     Problem: Skin/Tissue Integrity  Goal: Absence of new skin breakdown  Description: 1.  Monitor for areas of redness and/or skin breakdown  2.  Assess vascular access sites hourly  3.  Every 4-6 hours minimum:  Change oxygen saturation probe site  4.  Every 4-6 hours:  If on nasal continuous positive airway pressure, respiratory therapy assess nares and determine need for appliance change or resting period.  Outcome: Progressing     
  Problem: Discharge Planning  Goal: Discharge to home or other facility with appropriate resources  Outcome: Progressing  Flowsheets (Taken 9/28/2024 0926)  Discharge to home or other facility with appropriate resources:   Identify barriers to discharge with patient and caregiver   Arrange for needed discharge resources and transportation as appropriate   Identify discharge learning needs (meds, wound care, etc)   Refer to discharge planning if patient needs post-hospital services based on physician order or complex needs related to functional status, cognitive ability or social support system   Arrange for interpreters to assist at discharge as needed     Problem: Pain  Goal: Verbalizes/displays adequate comfort level or baseline comfort level  Outcome: Progressing  Flowsheets (Taken 9/28/2024 0926)  Verbalizes/displays adequate comfort level or baseline comfort level:   Encourage patient to monitor pain and request assistance   Assess pain using appropriate pain scale   Administer analgesics based on type and severity of pain and evaluate response   Implement non-pharmacological measures as appropriate and evaluate response   Notify Licensed Independent Practitioner if interventions unsuccessful or patient reports new pain   Consider cultural and social influences on pain and pain management     Problem: ABCDS Injury Assessment  Goal: Absence of physical injury  Outcome: Progressing     Problem: Safety - Adult  Goal: Free from fall injury  Outcome: Progressing     Problem: Nutrition Deficit:  Goal: Optimize nutritional status  Outcome: Progressing     Problem: Skin/Tissue Integrity  Goal: Absence of new skin breakdown  Description: 1.  Monitor for areas of redness and/or skin breakdown  2.  Assess vascular access sites hourly  3.  Every 4-6 hours minimum:  Change oxygen saturation probe site  4.  Every 4-6 hours:  If on nasal continuous positive airway pressure, respiratory therapy assess nares and determine need 
Pt had onset Abdominal pain after drinking 1/3 -1/2 of Bowel prep for Colonoscopy. Pt family Pt hadn't had BM in 4-5 days.  Able to have small BM's but still cramping in lower abd.  Attending and GI aware.  Holding rest of bowel prep, enema order... Pt wants to wait on enema this evening d/t pain. Morphine 1st given, Dilaudid ordered and was helpful.  Will continue plan of care  Problem: Discharge Planning  Goal: Discharge to home or other facility with appropriate resources  Outcome: Not Progressing     Problem: Pain  Goal: Verbalizes/displays adequate comfort level or baseline comfort level  Outcome: Not Progressing     Problem: ABCDS Injury Assessment  Goal: Absence of physical injury  Outcome: Not Progressing     
Deficit:  Goal: Optimize nutritional status  9/30/2024 0832 by Alejandrina Bolden RN  Outcome: Progressing  9/29/2024 2015 by Kiran Ingram LPN  Outcome: Progressing     Problem: Skin/Tissue Integrity  Goal: Absence of new skin breakdown  Description: 1.  Monitor for areas of redness and/or skin breakdown  2.  Assess vascular access sites hourly  3.  Every 4-6 hours minimum:  Change oxygen saturation probe site  4.  Every 4-6 hours:  If on nasal continuous positive airway pressure, respiratory therapy assess nares and determine need for appliance change or resting period.  9/30/2024 0832 by Alejandrina Bolden RN  Outcome: Progressing  9/30/2024 0709 by Nikole Tubbs, RN  Outcome: Progressing  9/29/2024 2015 by Kiran Ingram LPN  Outcome: Progressing

## 2024-10-01 NOTE — CARE COORDINATION
10/1/2024  11:31 AM  Care Management Progress Note     Reason for Admission:   Rectal bleeding [K62.5]  Colonic mass [K63.89]  Procedure(s) (LRB):  LAPAROSCOPIC DIVERTING COLOSTOMY (N/A)  6 Days Post-Op     Patient Admission Status: Inpatient  RUR: 8 % Low risk of Readmission/Green  Hospitalization in the last 30 days (Readmission):  No          Transition of care plan:  Pt discussed in IDR is continuing to require medical management, awaiting port for chemo, plan to DC today after procedure  DC when stable to home w/ family assistance, pt lives w/ her sister-in-law Jojo  who is at bedside today and agreeable to DC plan and  transport pt home and to all follow up  Nursing to provide pt w/ ostomy supplies at DC   Discharge plan communicated with patient and/or discharge caregiver: Yes    Outpatient follow-up, pt will follow up at [facility] for ostomy care,  CM scheduled appointment for 10/3 @ 8:00 AM, next available, AVS updated, family is aware of appointment and will transport pt     Transport at discharge: Sister in Law  There are no additional CM DC needs  DINO Herrera

## 2024-10-01 NOTE — CONSULTS
Cancer [place] at [facility]  [address], [address] [facility] [address]  W: [phone]  F: [phone]      Reason for Visit:   Charito Singh is a 49 y.o. female who is seen today for evaluation of rectal mass.     Hematology/Oncology Treatment History:   None prior     History of Present Illness:   Charito Singh is a pleasant 49 y.o. female who was admitted on 9/18/24  for rectal bleeding since Saturday   ED imaging reveals rectal mass at the junction of the sigmoid colon ( 5.5 x 5.2 x 5.1 cm) / no bowel obstruction, No evidence of distant disease in abd/pelvis. ED labs: WBC 11.8 Hgb 7.6 plts 438     Hx obtained with use of AMN;  # [id number]  Pt states has had lower abd pain and constipation  on and off x 1 yr. Noticed bright red blood on Saturday. Bleeding has now slowed down and appears darker.  Slight weigh loss of 4# since Aug.   Last menstrual cycle in Dec. No prior hx of anemia. Denies SOB.  At home active.   Smoking Hx: denies  ETOH use: denies   Mammogram: 1 week ago: per pt normal results  Colonoscopy: denies  Family hx of cancer: denies     : 3 children (30, 28 and 21 yrs old).  In the states x 6 months/ from [place]    No family at bedside    Review of systems was obtained and pertinent findings reviewed above. Past medical history, social history, family history, medications, and allergies are located in the electronic medical record.    Physical Exam:   Visit Vitals  /74   Pulse 73   Temp 98.2 °F (36.8 °C) (Oral)   Resp 16   Ht 1.626 m (5' 4\")   Wt 69 kg (152 lb 1.9 oz)   SpO2 99%   BMI 26.11 kg/m²     ECOG PS: 0  General: no distress  Eyes: anicteric sclerae  HENT: oropharynx clear  Neck: supple  Respiratory: normal respiratory effort/ RA  CV: no peripheral edema  GI: soft, nontender, nondistended, no masses  Skin: no rashes; no ecchymoses; no petechiae    Results:     Lab Results   Component Value Date    WBC 11.8 (H) 
.                        Jonny Mckeon MD  [phone] office    I am not permitted to use \"perfect serve\" use above for contact, thanks.   Gastroenterology Consultation Note      Admit Date: 9/18/2024  Consult Date: 9/18/2024   I greatly appreciate your asking me to see Charito Singh, thank you very much for the opportunity to participate in her care.    Assessment and plan  49-year-old female Guatemalan-speaking Guatemalan female presented to the hospital with several days of pelvic pressure, obstipation, blood on which she brown stools, nausea without vomiting, anorexia, stable vitals, mild pelvic tenderness without rebound or guarding on clinical exam, labs notable for profound iron deficiency anemia, CT abdomen pelvis with exophytic mid sigmoid colon nonobstructive mass.  No prior GI history.  Use of blood thinning medications.  Hemoglobin 7.6    Recommendations  - Recommend IV iron infusion  - Will plan on a gentle bowel prep today specifically GoLytely 1 L twice daily today and GoLytely 1 L twice daily tomorrow  -We will give Zofran 4 mg IV twice daily as a standing regimen for the next 2 days to promote tolerance of bowel preparation  - Please obtain CT chest with IV contrast as part of colon cancer staging  - Please check CEA with tomorrow's a.m. labs  -please check urinalysis with pelvis pressure   - Please obtain baseline EKG 12-lead, as part of preoperative assessment  - Recommend clinical diet for now  - Will plan on diagnostic colonoscopy Friday afternoon after gentle bowel preparation over the next several days  - Patient will likely need left hemicolectomy or limited sigmoid colon resection for I suspect is a localized sigmoid colon cancer    Complaint-pelvic pain, obstipation for the last several days    HPI  49-year-old Guatemalan-speaking  female presents to the emergency department reporting several days of pelvic pressure, moderate in severity, nonradiating, obstipation, bright red 
CRS Consult  Please see dictation    50 yo Scottish speaking who presents to ER with few day hx lower abdominal pain, blood per rectum. Hx obtained thru use of Video  Service. CT with large rectal mass without complete obstruction. No prior colonoscopy. More constipation and bleeding as of late.    PE:  Abd: soft, nt, not dt, no rebound, no guarding    CT: images, report reviewed  Labs: Hgb 7.6    Imp: large, primarily rectal based mass, concern for malignancy, obstruction    Recommendations:  Concern for rectal malignancy. Agree with GI Consult. Will need colonoscopy not only to biopsy mass for diagnosis, but also to see if Charito is at risk for obstruction (can the rectum be traversed with a PCF or gastroscope).    Given high likelihood for rectal malignancy, recommend MedOnc consult to begin staging studies (CT Chest, MR Pelvis with rectal cancer sequencing, other) while awaiting GI workup.     If mass NOT at high risk for complete obstruction, and mass confirmed to be a rectal adenocarcinoma, then would likely need upfront total neoadjuvant therapy under direction of MedOnc and RadOnc.    If mass at high risk for obstruction based on colonoscopy, then would likely need upfront laparoscopic diverting loop sigmoid colostomy.    Clinically, no signs of impending obstruction on physical exam or radiographically. If laparoscopic diversion needed, I would plan on fast tracking surgery, electively, in next 1-2 weeks. From my perspective, after initial workup completed, could be discharged home. I will be out of office from 9/19 until Tues 9/24. Again, no plans for in-hospital surgical diversion unless begins to clinically and radiograhically demonstrate signs of obstruction.    I would recommend, after work up complete, asoft, low fiber diet indefinitely. Miralax daily.    Srinivas Smith MD, FACS  Colon and Rectal Surgery  [facility]  
Please see consult from earlier today.   
Session ID: [id number]  Language: Hebrew   ID: #[id number]   Name: Thaddeus
Session ID: [id number]  Language: Slovenian   ID: #[id number]   Name: Darshan
Session ID: [id number]  Language: Yoruba   ID: #[id number]   Name: Beth
Session ID: [id number]  Language: Slovenian   ID: #[id number]   Name: Gordon
Session ID: [id number]  Language: Azeri   ID: #[id number]   Name: Rich Frances
Session ID: [id number]  Language: Frisian   ID: #[id number]   Name: Pauline
Session ID: [id number]  Language: Telugu   ID: #[id number]   Name: Annika
Session ID: [id number]  Language: Khmer   ID: #[id number]   Name: Rich
Session ID: [id number]  Language: Lao   ID: #[id number]   Name: Beth
Session ID: [id number]  Language: Faroese   ID: #[id number]   Name: Amanda
Session ID: [id number]  Language: Turkish   ID: #[id number]   Name: David
Session ID: [id number]  Language: Vietnamese   ID: #[id number]   Name: An
Session ID: [id number]  Language: Macedonian   ID: #[id number]   Name: Eloy
Session ID: [id number]  Language: Maori   ID: #[id number]   Name: Rodolfo Pace
Session ID: [id number]  Language: Bengali   ID: #[id number]   Name: Sera
Session ID: [id number]  Language: Georgian   ID: #[id number]   Name: Elton
Session ID: [id number]  Language: Serbian   ID: #[id number]   Name: Angela
Session ID: [id number]  Language: Yoruba   ID: #[id number]   Name: Bhargavi Laughlin
Session ID: [id number]  Language: Indonesian   ID: #[id number]   Name: Fawn
Session ID: [id number]  Language: Occitan   ID: #[id number]   Name: Beth
Session ID: [id number]  Language: Azeri   ID: #[id number]   Name: Rich Mckeon @  discussing w/ pt and sister-in-law. Pt states understanding as to results and plan.     
Session ID: [id number]  Language: Mohawk   ID: #[id number]   Name: Jillian Howard
Session ID: [id number]  Language: Mohawk   ID: #[id number]   Name: Mracellus
Session ID: [id number]  Language: Hungarian   ID: #[id number]   Name: Isabela
Session ID: [id number]  Language: Greek   ID: #[id number]   Name: Justin Dumas
Session ID: [id number]  Language: Chinese   ID: #[id number]   Name: Tonio
Session ID: [id number]  Language: Maltese   ID: #[id number]   Name: Destini
Session ID: [id number]  Language: Pashto   ID: #[id number]   Name: Lorin
Session ID: [id number]  Language: Kinyarwanda   ID: #[id number]   Name: Mia
Session ID: [id number]  Language: Malay   ID: #[id number]   Name: Franklin
Session ID: [id number]  Language: Italian   ID: #[id number]   Name: Eloy
Session ID: [id number]  Language: Hebrew   ID: #[id number]   Name: Angelo
Session ID: [id number]  Language: Hungarian   ID: #[id number]   Name: Smooth
Session ID: [id number]  Language: Kinyarwanda   ID: #[id number]   Name: Estrellita
 Temp is 98.8, pulse is 73, blood pressure is 101/67, O2 saturations are 97% on room air, respirations are 16.  HEENT: Sclerae are anicteric.  Trachea midline.  ABDOMEN:  Soft.  I can appreciate no obvious distention.  I see no surgical scars.  There is no rebound tenderness.  There is no guarding.  There is no abdominal wall cellulitis.    LABORATORY DATA:  Reviewed.  This shows a white blood cell count of 11.8, hemoglobin of 7.6, platelets are 438.  Sodium 139, potassium 3.7, chloride 109, bicarb 26, BUN 10, creatinine 0.69, albumin is 3.7, ALT is 18, AST is 12.  CT scan images were reviewed.  Report was reviewed as well.  This describes a large mass at the junction of the sigmoid colon and rectum.  No obvious metastatic disease, although there were some concerns regarding possible local regional mets.    IMPRESSION:  Large rectal mass concerned for malignancy and potential for obstruction.    PLAN:  Obviously, there is a great concern for rectal malignancy.  CT imaging to me seems to place this lesion fairly squarely at least to the mid rectum if not lower.  I agree with the GI consult.  She will need a colonoscopy and only to biopsy the mass for diagnosis, but also to see if the patient is at risk for obstruction (given the rectum to be traversed with the PCF or gastroscope).    Given high likelihood for rectal malignancy, I would recommend Med-Onc consult to begin staging studies (CT chest, MR pelvis with rectal cancer sequencing, other) while awaiting GI workup.    If mass is not at high risk for complete obstruction and mass is confirmed to be a rectal adenocarcinoma, then would likely need total neoadjuvant therapy under direction of Medical Oncology and Radiation Oncology.    If the mass is at high risk for obstruction based on colonoscopy, would will likely need upfront laparoscopic diverting loop sigmoid colostomy before initiating any systemic or radiographic therapy.    Clinically, there are no signs of

## 2024-10-02 LAB
DIRECTOR REVIEW: NORMAL
DPYD GENOTYPE: NORMAL
DPYD INFORMATION: NORMAL
DPYD METABOLIC ACTIVITY: NORMAL
INTERPRETATION: NORMAL

## 2024-10-03 ENCOUNTER — CLINICAL DOCUMENTATION (OUTPATIENT)
Facility: HOSPITAL | Age: 49
End: 2024-10-03

## 2024-10-03 ENCOUNTER — HOSPITAL ENCOUNTER (OUTPATIENT)
Facility: HOSPITAL | Age: 49
Discharge: HOME | End: 2024-10-03

## 2024-10-03 ENCOUNTER — OFFICE VISIT (OUTPATIENT)
Age: 49
End: 2024-10-03

## 2024-10-03 VITALS
DIASTOLIC BLOOD PRESSURE: 61 MMHG | TEMPERATURE: 97.9 F | SYSTOLIC BLOOD PRESSURE: 122 MMHG | RESPIRATION RATE: 16 BRPM | HEART RATE: 84 BPM

## 2024-10-03 VITALS
DIASTOLIC BLOOD PRESSURE: 62 MMHG | HEART RATE: 79 BPM | RESPIRATION RATE: 18 BRPM | OXYGEN SATURATION: 98 % | WEIGHT: 143.6 LBS | SYSTOLIC BLOOD PRESSURE: 102 MMHG | TEMPERATURE: 98.1 F | BODY MASS INDEX: 24.52 KG/M2 | HEIGHT: 64 IN

## 2024-10-03 DIAGNOSIS — C20 RECTAL CANCER (HCC): ICD-10-CM

## 2024-10-03 DIAGNOSIS — Z51.11 ENCOUNTER FOR ANTINEOPLASTIC CHEMOTHERAPY: Primary | ICD-10-CM

## 2024-10-03 DIAGNOSIS — K94.19 ALTERED BOWEL ELIMINATION DUE TO INTESTINAL OSTOMY (HCC): ICD-10-CM

## 2024-10-03 PROCEDURE — 99215 OFFICE O/P EST HI 40 MIN: CPT | Performed by: INTERNAL MEDICINE

## 2024-10-03 PROCEDURE — 99214 OFFICE O/P EST MOD 30 MIN: CPT

## 2024-10-03 RX ORDER — PROCHLORPERAZINE MALEATE 10 MG
10 TABLET ORAL EVERY 6 HOURS PRN
Qty: 120 TABLET | Refills: 3 | Status: SHIPPED | OUTPATIENT
Start: 2024-10-03

## 2024-10-03 RX ORDER — LIDOCAINE/PRILOCAINE 2.5 %-2.5%
CREAM (GRAM) TOPICAL
Qty: 30 G | Refills: 2 | Status: SHIPPED | OUTPATIENT
Start: 2024-10-03

## 2024-10-03 RX ORDER — ONDANSETRON 4 MG/1
8 TABLET, ORALLY DISINTEGRATING ORAL 3 TIMES DAILY PRN
Qty: 21 TABLET | Refills: 3 | Status: CANCELLED | OUTPATIENT
Start: 2024-10-03

## 2024-10-03 RX ORDER — ACETAMINOPHEN 500 MG
500 TABLET ORAL EVERY 6 HOURS PRN
COMMUNITY

## 2024-10-03 RX ORDER — DOCUSATE SODIUM 100 MG/1
100 CAPSULE, LIQUID FILLED ORAL 2 TIMES DAILY
Qty: 60 CAPSULE | Refills: 0 | Status: SHIPPED | OUTPATIENT
Start: 2024-10-03 | End: 2024-11-02

## 2024-10-03 ASSESSMENT — PAIN - FUNCTIONAL ASSESSMENT: PAIN_FUNCTIONAL_ASSESSMENT: PREVENTS OR INTERFERES SOME ACTIVE ACTIVITIES AND ADLS

## 2024-10-03 ASSESSMENT — PAIN DESCRIPTION - PAIN TYPE: TYPE: CHRONIC PAIN

## 2024-10-03 ASSESSMENT — PAIN DESCRIPTION - ORIENTATION: ORIENTATION: RIGHT

## 2024-10-03 ASSESSMENT — PATIENT HEALTH QUESTIONNAIRE - PHQ9
SUM OF ALL RESPONSES TO PHQ QUESTIONS 1-9: 0
SUM OF ALL RESPONSES TO PHQ QUESTIONS 1-9: 0
SUM OF ALL RESPONSES TO PHQ9 QUESTIONS 1 & 2: 0
SUM OF ALL RESPONSES TO PHQ QUESTIONS 1-9: 0
SUM OF ALL RESPONSES TO PHQ QUESTIONS 1-9: 0
1. LITTLE INTEREST OR PLEASURE IN DOING THINGS: NOT AT ALL
2. FEELING DOWN, DEPRESSED OR HOPELESS: NOT AT ALL

## 2024-10-03 ASSESSMENT — PAIN DESCRIPTION - DESCRIPTORS: DESCRIPTORS: ACHING;SHARP

## 2024-10-03 ASSESSMENT — PAIN DESCRIPTION - FREQUENCY: FREQUENCY: CONTINUOUS

## 2024-10-03 ASSESSMENT — PAIN DESCRIPTION - LOCATION: LOCATION: ABDOMEN

## 2024-10-03 ASSESSMENT — PAIN SCALES - GENERAL: PAINLEVEL_OUTOF10: 5

## 2024-10-03 NOTE — PROGRESS NOTES
Cancer [place] at [facility]  [address], [address] [facility] [address]  W: [phone]  F: [phone] Patient ID  Name: Charito Singh  YOB: 1975  MRN: [id number]  Referring Provider:   No referring provider defined for this encounter.  Primary Care Provider:   None, None       HEMATOLOGY/MEDICAL ONCOLOGY  NOTE     Reason for Evaluation:     Chief Complaint   Patient presents with    Follow-Up from Hospital     Oncology History:   Please review original records for clinical decision making. This summary highlights focused aspects of patient's ongoing care and may have a recurring section in notes with either updates or remain unchanged as a longitudinal care summary.  ______________________________________________________   DIAGNOSIS: RECTAL CANCER   <>PATHOLOGY<>  Specimen #:[id number] Collect: 9/20/2024  Mismatch Repair (MMR) Interpretation   No loss of nuclear expression of MMR proteins: No evidence of deficient   mismatch repair (low probability of MSI-H)    FINAL PATHOLOGIC DIAGNOSIS        Rectum, mass; biopsy:        Adenocarcinoma, at least intramucosal     <>STAGING<>   Cancer Staging   Rectal cancer (HCC)  Staging form: Colon And Rectum, AJCC 8th Edition  - Clinical stage from 9/23/2024: Stage Unknown (cT4b, cNX, cM0)      <>GOALS OF CARE<>CURATIVE INTENT  <>CURRENT TREATMENT<> Total Neoadjuvant Therapy recommended   <>PRIOR TREATMENT>n/a  <>BIOMARKERS<>  Lab Results   Component Value Date    CEA 1.2 09/19/2024     IR PORT PLACEMENT > 5 YEARS 10/1/2024  Technically successful placement of a single lumen port with the catheter tip in the right atrium. Catheter is ready for immediate use. Electronically signed by TASH CARRINGTON    CT CHEST ABDOMEN PELVIS W CONTRAST  9/27/2024  FINDINGS: MEDIASTINUM: Aortic atherosclerotic change No hilar or mediastinal lymphadenopathy. No esophageal mass. No endotracheal or endobronchial mass. PLEURA/LUNGS:: No

## 2024-10-03 NOTE — PROGRESS NOTES
Chief Complaint   Patient presents with    Follow-Up from Hospital           Vitals:    10/03/24 1506   BP: 102/62   Pulse: 79   Resp: 18   Temp: 98.1 °F (36.7 °C)   SpO2: 98%            1. Have you been to the ER, urgent care clinic since your last visit?  Hospitalized since your last visit?  Hospital Follow-up  2. Have you seen or consulted any other health care providers outside of the [facility] since your last visit?  Include any pap smears or colon screening. Hospital Follow-up

## 2024-10-03 NOTE — WOUND CARE
OSTOMY Assessment    Stoma Tissue Assessment: __x__Post-op ___Follow-up       Type of Diversion: __x_New___ Established ___Revision___Permanent____Temporary    _____  Ileostomy   __x___  Colostomy: ____ Ascending, ____ Transverse, _____. Sigmoid   _____  Urostomy   _____  Ileal Conduit   _____  Mucous Fistula     Appearance of Ostomy:   _x__ Bright Red /Moist/Viable ___ Dark Red ___ Pink ___ Sloughing ___Necrotic____Pallor ____Red  ___Dry ____Moist    Stoma Size: _28________ (mm) ___Round _x__ Oval ___Irregular     Stoma Height: Slightly budded but located in a deep concave when in sitting position.   ____Flush ____Retracted __x__Budded ____Edematous ____Prolapse     Stoma Location  ____ Left Side          __x__Right Side     _____Umbilicus  _____Incision Line  ____ Above Belt       ____ Below Belt    Abdominal Contours  ____ Firm ____ Flat ____Flabby __x_Soft ___Round ___ Hard ___ Other    GI Stoma Function: _x__ Yes ___No   Output:   ____Sero-Sanguenous ____Sanguenous____ Bilious ____ Liquid __x__Semi-liquid ____ Pasty ____ Formed ___Soft ___Firm ____ Black____Brown ____Foul Odor    Peristomal skin: Evidence of leaking of effluent onto peristomal skin  _x__ Intact ___ Irritant Dermatitis ___ Allergic Contact Dermatitis ___Candidiasis ___Caput Medusae ___Folliculitis ___Mechanical Trauma ___Mucosal Transplantation    ___Pyoderma Gangrenosum ___Hyperplasia ___Radiation Trauma ___Allergies mpling  ___ Dimpling ____Blistered ____Fragile ____Macerated ___Peeling  ___Ulceration  ___ Fungal ___Peristomal Hernia ___Non-blanchable ___ Hypergranulation      Stoma Complications:   __Excessive Bleeding __Ischemia __ Abscess __Necrosis __Prolapse   __Hernia __ Retraction __Stenosis __Mucosal Separation __Melanosis Coli   __Laceration __Other     Application for Patient    Wafer and pouch used during assessment and education _[place] [id number]___________    Pouch System Recommended:   _x_One-Piece __Two-Piece ___Custom     Flat:  
Ostomy Care Plan  Risk for impaired skin integrity  Goal: Patient will exhibit intact peristomal skin at each visit.  Interventions: Assess patients peristomal skin at each visit.                            Instruct patient in peristomal skin care procedures at each visit.    Outcomes: Patient will demonstrate proper sizing and application of wafer.                       Patient will demonstrate intact peristomal skin without irritation.                       Patient will verbalize two strategies to prevent skin irritation.    Risk for disturbed body image  Goal: Patient will verbalize concerns in appearance, relationships and lifestyle changes.  Interventions: Assess patients coping mechanisms and body image issues at each visit.  Outcomes: Patient will verbalize/demonstrate comfort with body image as evidenced by willingness to manage ostomy care and acceptance of body image change.     Knowledge Deficit r/t Ostomy Care  Goal: Patient will verbalize/demonstrate independent ostomy care by end of teaching sessions.  Interventions: Assess patients understanding/ability to manage ostomy care including daily maintenance of pouch and intermittent changing of appliance.  Outcomes: Patient will demonstrate ability to remove, clean and reapply ostomy pouch.  Patient will verbalize understanding of ostomy pouching complications (i.e. failure to maintain a seal for at least 3 to 4 days, leaking of pouch, skin irritation of peristomal skin).     Goal 1 addressed today, patient was struggling with pain today so we will review again at next visit.  Goal 2 begun today, discharge instructions were reviewed one by one with patient, sister-in-law and  and they verbalized understanding. Will continue teaching at next visit in 1 week.   
Assistance Requires at least one staff member to physically assist the patient in ambulating into the treatment room and/or on off chair/bed.  Requires assistance to the bathroom.   []   2   Full Assistance Requires assistance of at least two staff members to transfer the patient into the treatment room and/or on/off bed/chair. \"Total Transfer.”  Unable to use bathroom requires bedside commode and/or bedpan []   3       Teaching Effort Documented in [facility] Clinical Note  Effort Definition Points   No Teaching  []   0   General Initial/Simple lesson:  Assess readiness to learn, and assess patient learning style to determine educational flow/special needs for learning.  Teaching related to 1-3 topics  Documentation in [facility] completed.   []   1   Intermediate Assess readiness to learn, and assess patient learning style to determine educational flow/special needs for learning.  Teaching related to 3-4 topics.   Hernia belt application and care considerations  Documentation in [facility] completed.   [x]   2   Complex Assess readiness to learn, and assess patient learning style to determine educational flow/special needs for learning.  The teaching of greater than 5 additional topics   Pre-operative ostomy education with a review of written resources for patient/family/caregiver as needed.  Demonstration/return demonstration of ostomy irrigation  Documentation in [facility] completed.   []   3     Patient Assessment and Planning in [facility] Clinical Note   Planning Definition Points   Simple Simple pouch change procedure completed and reviewed with patient/family/caregiver.   Documentation in [facility] completed.     []   1   Intermediate Moderate level of follow-up needs:   Pouch change/discharge procedure revised and reviewed with patient/caregiver.    Communications with outside resources, i.e., Telephone calls to Surgeon/ PCP, family/caregiver, home health, ECF.   Documentation in [facility] completed.     []   2   Complex 
Signature:_______________________    Date: ___________ Time:  ____________

## 2024-10-04 ENCOUNTER — CLINICAL DOCUMENTATION (OUTPATIENT)
Age: 49
End: 2024-10-04

## 2024-10-04 DIAGNOSIS — C20 RECTAL CANCER (HCC): Primary | ICD-10-CM

## 2024-10-04 NOTE — PROGRESS NOTES
[facility]  [facility]  APRN-NP Encounter    Name:    Charito Singh  :    1975  Diagnosis: Rectal Cancer    Encounter type:  [x]Initial APRN-NP Coordinator Encounter  []Patient Initiated  [] Follow-up  []Other:      Narrative:   Met patient in office at first consult.  Patient reports difficulty with pain management, MD addressing.  Briefly introduced self and role  Will follow up next week to ensure referrals, answer questions.  Referrals placed to SW and Oncology dietician.   MD to address fertility  Patient MMR proficient, NCCN and ASCO guidelines due to her age and disease status will meet criteria for genomics and germline genetics.      Interdisciplinary Team:  Med-Onc: Dr. Darrin Arvizu MD  Surgery: Dr Srinivas Smith MD, [facility]  GI MD: GUNJAN White  Rad-Onc:   Dietician: Jillian Kemp  : BESSIE Eldridge-NP ONN: Nikole Philippe DNP, APRN-NP, OCN, AGCN  Oncology Nurse Practitioner and Genetics Clinical Nurse Medical Oncology  [facility] at  [facility]  [address], [address]  [place]  [address]  W: [phone]  F: [phone]

## 2024-10-05 PROBLEM — Z51.11 ENCOUNTER FOR ANTINEOPLASTIC CHEMOTHERAPY: Status: ACTIVE | Noted: 2024-10-05

## 2024-10-10 LAB
DIRECTOR REVIEW: NORMAL
DPYD GENOTYPE: NORMAL
DPYD INFORMATION: NORMAL
DPYD METABOLIC ACTIVITY: NORMAL
INTERPRETATION: NORMAL

## (undated) DEVICE — SOLUTION IRRIG 1000ML STRL H2O USP PLAS POUR BTL

## (undated) DEVICE — SEALER TISS L35CM DIA5MM CRV JAW ARTC ADV BPLR ENSEAL G2

## (undated) DEVICE — CONTAINER SPEC 20 ML LID NEUT BUFF FORMALIN 10 % POLYPR STS

## (undated) DEVICE — 3M™ CUROS™ DISINFECTING CAP FOR NEEDLELESS CONNECTORS 270/CARTON 20 CARTONS/CASE CFF1-270: Brand: CUROS™

## (undated) DEVICE — STAPLER INT L34CM 60MM LNG ENDOSCP ARTC PWR + ECHELON FLX

## (undated) DEVICE — SUTURE VICRYL + SZ 3-0 L18IN ABSRB UD SH 1/2 CIR TAPERCUT NDL VCP864D

## (undated) DEVICE — LIQUIBAND RAPID ADHESIVE 36/CS 0.8ML: Brand: MEDLINE

## (undated) DEVICE — SUTURE MONOCRYL + SZ 4-0 L27IN ABSRB UD L19MM PS-2 3/8 CIR MCP426H

## (undated) DEVICE — SUTURE VICRYL + SZ 0 L27IN ABSRB VLT L26MM UR-6 5/8 CIR VCP603H

## (undated) DEVICE — SEPRAFILM ADHESION BARRIER (MEMBRANE) IS A STERILE, BIORESORBABLE, TRANSLUCENT ADHESION BARRIER COMPOSED OF TWO ANIONIC POLYSACCHARIDES, SODIUM HYALURONATE (HA) AND CARBOXYMETHYLCELLULOSE (CMC).: Brand: SEPRAFILM

## (undated) DEVICE — SET GRAV CK VLV NEEDLESS ST 3 GANGED 4WAY STPCOCK HI FLO 10

## (undated) DEVICE — TROCAR LAP L100MM DIA5MM BLDELSS W/ STBL SL ENDOPATH XCEL

## (undated) DEVICE — SOLUTION IV 1000ML 0.9% SOD CHL PH 5 INJ USP VIAFLX PLAS

## (undated) DEVICE — GLOVE ORANGE PI 7   MSG9070

## (undated) DEVICE — SUTURE PERMAHAND SZ 3-0 L18IN NONABSORBABLE BLK L26MM SH C013D

## (undated) DEVICE — Device: Brand: SENSURA MIO

## (undated) DEVICE — YANKAUER,BULB TIP,W/O VENT,RIGID,STERILE: Brand: MEDLINE

## (undated) DEVICE — SOLUTION IRRIG 1000ML 0.9% SOD CHL USP POUR PLAS BTL

## (undated) DEVICE — SYSTEM SMK EVAC LAP TBNG FILTER HSNG BENT STYL PNK SEE CLR

## (undated) DEVICE — SPONGE LAP W18XL18IN WHT COT 4 PLY FLD STRUNG RADPQ DISP ST 2 PER PACK

## (undated) DEVICE — COLON CLOSING PACK: Brand: MEDLINE INDUSTRIES, INC.

## (undated) DEVICE — TUBING, SUCTION, 1/4" X 10', STRAIGHT: Brand: MEDLINE

## (undated) DEVICE — MARKER SKIN PREP-RESISTANT ST: Brand: MEDLINE INDUSTRIES, INC.

## (undated) DEVICE — COVER LT HNDL PLAS RIG 1 PER PK

## (undated) DEVICE — GYN LAPAROSCOPY-SFMC: Brand: MEDLINE INDUSTRIES, INC.

## (undated) DEVICE — JELLY,LUBE,STERILE,FLIP TOP,TUBE,4-OZ: Brand: MEDLINE

## (undated) DEVICE — DRAPE FLD WRM W44XL66IN C6L FOR INTRATEMP SYS THERMABASIN

## (undated) DEVICE — DISPOSABLE GRASPER CARTRIDGE: Brand: DIRECT DRIVE REPOSABLE GRASPERS

## (undated) DEVICE — BANDAGE COBAN 4 IN COMPR W4INXL5YD FOAM COHESIVE QUIK STK SELF ADH SFT

## (undated) DEVICE — TROCAR ENDOSCP L100MM DIA12MM BLDELSS OBT RADLUC STBL SL

## (undated) DEVICE — LAPAROSCOPIC TROCAR SLEEVE/SINGLE USE: Brand: KII® OPTICAL ACCESS SYSTEM

## (undated) DEVICE — RELOAD STPL L75MM OPN H3.8MM CLS 1.5MM WIRE DIA0.2MM REG

## (undated) DEVICE — 3M™ IOBAN™ 2 ANTIMICROBIAL INCISE DRAPE 6648EZ: Brand: IOBAN™ 2

## (undated) DEVICE — TOWEL,OR,DSP,ST,BLUE,STD,4/PK,20PK/CS: Brand: MEDLINE

## (undated) DEVICE — STAPLER INT L75MM CUT LN L73MM STPL LN L77MM BLU B FRM 8

## (undated) DEVICE — BLADE,CARBON-STEEL,15,STRL,DISPOSABLE,TB: Brand: MEDLINE

## (undated) DEVICE — ACCESS PLATFORM FOR MINIMALLY INVASIVE SURGERY: Brand: GELPOINT®  MINI ADVANCED ACCESS PLATFORM

## (undated) DEVICE — PAD POSITIONING WNG STD KIT W/BODY STRP LF DISP

## (undated) DEVICE — SUTURE PERMAHAND SZ 0 L30IN NONABSORBABLE BLK SILK BRAID A306H

## (undated) DEVICE — SUTURE ABSORBABLE ANTIBACT 1-0 CT-1 24 IN STRATAFIX PDS + SXPP1A443

## (undated) DEVICE — STERILE-Z MAYO STAND COVERS CLEAR POLYETHYLENE STERILE UNIVERSAL FIT 20 PER CASE: Brand: STERILE-Z

## (undated) DEVICE — ACCESS PLATFORM FOR MINIMALLY INVASIVE SURGERY.: Brand: GELPORT® LAPAROSCOPIC  SYSTEM

## (undated) DEVICE — BLADE ES L6IN ELASTOMERIC COAT EXT DURABLE BEND UPTO 90DEG

## (undated) DEVICE — SUTURE VICRYL + SZ 3-0 L27IN ABSRB UD L26MM SH 1/2 CIR VCP416H

## (undated) DEVICE — CLICKLINE SCISSORS INSERT: Brand: CLICKLINE

## (undated) DEVICE — CANISTER, RIGID, 3000CC: Brand: MEDLINE INDUSTRIES, INC.

## (undated) DEVICE — 2CB5LT  ENDPH XCEL INTEGRATED STABILITY: Brand: MEDLINE RENEWAL

## (undated) DEVICE — SOLUTION ANTIFOG VIS SYS CLEARIFY LAPSCP

## (undated) DEVICE — TAPE ADH W3INXL10YD PLAS TRNSPAR H2O RESIST HYPOALRG CURAD